# Patient Record
Sex: MALE | Race: WHITE | NOT HISPANIC OR LATINO | Employment: FULL TIME | URBAN - METROPOLITAN AREA
[De-identification: names, ages, dates, MRNs, and addresses within clinical notes are randomized per-mention and may not be internally consistent; named-entity substitution may affect disease eponyms.]

---

## 2017-01-10 DIAGNOSIS — I10 ESSENTIAL HYPERTENSION WITH GOAL BLOOD PRESSURE LESS THAN 140/90: Primary | ICD-10-CM

## 2017-01-10 NOTE — TELEPHONE ENCOUNTER
Patient informed in June to return in 6 months for appointment and labs.  Patient didn't do this.    No appointment pending at this time.  Routing to provider to advise.    Hermelinda Cornelius RN

## 2017-01-10 NOTE — Clinical Note
[facility]                                             [address] [place]  [address]    January 12, 2017    Jose Hernandez  [address] [facility] [address]    Dear Jose,       We recently received a refill request for lisinopril-hydrochlorothiazide (PRINZIDE/ZESTORETIC) 10-12.5 MG per tablet.  We have refilled this for a one time 30 day supply only because you are due for a:    Blood pressure office visit with Dr Mcpherson      Please call at your earliest convenience so that there will not be a delay with your future refills.          Thank you,   Your [facility] Care Team/  [phone]

## 2017-01-11 RX ORDER — LISINOPRIL/HYDROCHLOROTHIAZIDE 10-12.5 MG
1 TABLET ORAL DAILY
Qty: 30 TABLET | Refills: 0 | Status: SHIPPED | OUTPATIENT
Start: 2017-01-11 | End: 2017-03-24

## 2017-02-08 DIAGNOSIS — I10 ESSENTIAL HYPERTENSION WITH GOAL BLOOD PRESSURE LESS THAN 140/90: Primary | ICD-10-CM

## 2017-02-08 RX ORDER — LISINOPRIL/HYDROCHLOROTHIAZIDE 10-12.5 MG
1 TABLET ORAL DAILY
Qty: 30 TABLET | Refills: 0 | Status: CANCELLED | OUTPATIENT
Start: 2017-02-08

## 2017-02-08 NOTE — TELEPHONE ENCOUNTER
See last refill.  Pharmacy notified of denial of refill of lisinopril-hctz, ov needed.   pharmacy notified.  To provider to cosign  Mayra Freitas RN

## 2017-02-21 DIAGNOSIS — I10 ESSENTIAL HYPERTENSION WITH GOAL BLOOD PRESSURE LESS THAN 140/90: ICD-10-CM

## 2017-02-21 RX ORDER — LISINOPRIL/HYDROCHLOROTHIAZIDE 10-12.5 MG
1 TABLET ORAL DAILY
Qty: 30 TABLET | Refills: 0 | Status: CANCELLED | OUTPATIENT
Start: 2017-02-21

## 2017-02-22 NOTE — TELEPHONE ENCOUNTER
BARTOLO at [facility] calling on status of lisinopril refill.  His phone is [phone].

## 2017-02-23 NOTE — TELEPHONE ENCOUNTER
See  Previous refill requests for lisinopril-hctz.   Pharmacy notified again refill is denied, pt due for appointment.   Mayra Freitas RN

## 2017-03-24 ENCOUNTER — OFFICE VISIT (OUTPATIENT)
Dept: FAMILY MEDICINE | Facility: CLINIC | Age: 42
End: 2017-03-24
Payer: COMMERCIAL

## 2017-03-24 DIAGNOSIS — E78.5 DYSLIPIDEMIA: Primary | ICD-10-CM

## 2017-03-24 DIAGNOSIS — I10 ESSENTIAL HYPERTENSION WITH GOAL BLOOD PRESSURE LESS THAN 140/90: ICD-10-CM

## 2017-03-24 PROCEDURE — 99214 OFFICE O/P EST MOD 30 MIN: CPT | Performed by: FAMILY MEDICINE

## 2017-03-24 RX ORDER — LISINOPRIL/HYDROCHLOROTHIAZIDE 10-12.5 MG
1 TABLET ORAL DAILY
Qty: 90 TABLET | Refills: 3 | Status: SHIPPED | OUTPATIENT
Start: 2017-03-24 | End: 2018-03-22

## 2017-03-24 NOTE — NURSING NOTE
"Chief Complaint   Patient presents with     Hypertension       Initial BP (!) 136/92 (Cuff Size: Adult Large)  Pulse 62  Temp 97  F (36.1  C) (Oral)  Ht 5' 10\" (1.778 m)  Wt 195 lb (88.5 kg)  SpO2 98%  BMI 27.98 kg/m2 Estimated body mass index is 27.98 kg/(m^2) as calculated from the following:    Height as of this encounter: 5' 10\" (1.778 m).    Weight as of this encounter: 195 lb (88.5 kg).  Medication Reconciliation: complete    Rebecca Salcido LPN    "

## 2017-03-24 NOTE — PROGRESS NOTES
HPI:    Jose is a 41 year old male here for follow up:    HTN - dx'd Jan 2015. He checks at home about 2 times per week - around 130/80. Controlled in clinic.  Treatment:   Prinzide 10/12.5 qd. No side effects.    Last Basic Metabolic Panel:  NA      144   12/15/2015   POTASSIUM      3.9   12/15/2015  CHLORIDE      109   12/15/2015  DAVID      8.8   12/15/2015  CO2       27   12/15/2015  BUN       13   12/15/2015  CR     1.26   12/15/2015  GLC       93   12/15/2015  CBC RESULTS:   Recent Labs   Lab Test  02/04/15   0742   WBC  6.2   RBC  5.10   HGB  15.1   HCT  44.7   MCV  88   MCH  29.6   MCHC  33.8   RDW  12.4   PLT  273     Dyslipidemia (trigs as high as 799 on 12/16/11) - No history of CAD, CVA, PAD or diabetes. Per ATP4, moderate intensity statin recommended.  Treatment:   Taking Fenofibrate 160 mg daily without side effects. Also fish oil 3 gm per day.     The 10-year ASCVD risk score (Bridget DC Jr, et al., 2013) is: 1%    Values used to calculate the score:      Age: 41 years      Sex: Male      Is Non- : No      Diabetic: No      Tobacco smoker: No      Systolic Blood Pressure: 136 mmHg      Is BP treated: Yes      HDL Cholesterol: 44 mg/dL      Total Cholesterol: 130 mg/dL    Recent Labs   Lab Test  12/15/15   0730  02/04/15   0742   09/05/13   0724   CHOL  130  181   --   207*   HDL  44  37*   --   33*   LDL  63  90   --   Cannot estimate LDL when triglyceride exceeds 400 mg/dL  80   TRIG  114  269*   < >  461*   CHOLHDLRATIO   --   4.9   --   6.4*    < > = values in this interval not displayed.       Obesity - declined referral to nutrition. Discussed diet and exercise. He has lost quite a bit of weight on his own. I commended him.    TSH     Date   Value   Ref Range   Status     2/4/2015   1.15    0.40 - 4.00 mU/L   Final     Effective 7/30/2014, the reference range for this assay has changed to reflect  new instrumentation/methodology.      Wt Readings from Last 5 Encounters:  "  03/24/17 195 lb (88.5 kg)   06/21/16 189 lb (85.7 kg)   12/22/15 199 lb (90.3 kg)   05/04/15 228 lb (103.4 kg)   02/25/15 231 lb (104.8 kg)     Preventive -     Flu shot: declined     Immunization History   Administered Date(s) Administered     Influenza (IIV3) 01/12/2015     TDAP Vaccine (Adacel) 12/16/2011       ROS:    Const: No fevers or night sweats recently.  ENT: No runny nose, sore throat or ear pain.  Resp: No cough or shortness of breath.  CV: No chest pain, dizziness or cardiac palpitations.  GI: No nausea, vomiting, diarrhea or constipation. Denies blood in stools or black stools.  : No dysuria, frequency or hematuria.    SH:    Marital status: marrried  Kids: 4  Employment: [employer]  Exercise: Tae Marnie Do 2-3 per week, some running  Tobacco: no  Etoh: 1 per month  Recreational drugs: no  Caffeine: one per day but now nothing    FH:    Unknown if there is HTN.      Exam:    /88 (Cuff Size: Adult Large)  Pulse 62  Temp 97  F (36.1  C) (Oral)  Ht 5' 10\" (1.778 m)  Wt 195 lb (88.5 kg)  SpO2 98%  BMI 27.98 kg/m2    Gen: Healthy appearing male in no acute distress  Neck: No enlarged lymph nodes, thyromegally or other masses.  Lungs: Good air movement and otherwise clear.  CV: Heart RRR with no murmurs. No JVD, carotid bruits or leg edema.      Assessment and Plan - Decision Making    1. Essential hypertension with goal blood pressure less than 140/90  Controlled. Continue same tx. Check future labs since not fasting today.  - lisinopril-hydrochlorothiazide (PRINZIDE/ZESTORETIC) 10-12.5 MG per tablet; Take 1 tablet by mouth daily  Dispense: 90 tablet; Refill: 3  - Basic metabolic panel; Future    2. Dyslipidemia  Controlled. Continue same tx. Check future labs since not fasting today.  - Lipid panel reflex to direct LDL; Future      Written instructions given as follows:    Patient Instructions   1. Please make a lab only appointment to give fasting blood sample.    2. Keep an eye " on your blood pressure at home and let me know if 140/90 or greater persistently.    3. See you back in 1 year for a physical with fasting labs.

## 2017-03-24 NOTE — MR AVS SNAPSHOT
After Visit Summary   3/24/2017    Jose Hernandez    MRN: [id number]           Patient Information     Date Of Birth          1975        Visit Information        Provider Department      3/24/2017 8:50 AM Silverio Mcpherson MD [facility]        Today's Diagnoses     Essential hypertension with goal blood pressure less than 140/90          Care Instructions    1. Please make a lab only appointment to give fasting blood sample.    2. Keep an eye on your blood pressure at home and let me know if 140/90 or greater persistently.    3. See you back in 1 year for a physical with fasting labs.        Follow-ups after your visit        Future tests that were ordered for you today     Open Future Orders        Priority Expected Expires Ordered    Basic metabolic panel Routine  3/24/2018 3/24/2017            Who to contact     If you have questions or need follow up information about today's clinic visit or your schedule please contact [facility] directly at [phone].  Normal or non-critical lab and imaging results will be communicated to you by [employer], letter or phone within 4 business days after the clinic has received the results. If you do not hear from us within 7 days, please contact the clinic through [employer] or phone. If you have a critical or abnormal lab result, we will notify you by phone as soon as possible.  Submit refill requests through [employer] or call your pharmacy and they will forward the refill request to us. Please allow 3 business days for your refill to be completed.          Additional Information About Your Visit        [employer] Information     [employer] gives you secure access to your electronic health record. If you see a primary care provider, you can also send messages to your care team and make appointments. If you have questions, please call your primary care clinic.  If you do not have a primary care provider, please call [phone] and they will  "assist you.        Care EveryWhere ID     This is your Care EveryWhere ID. This could be used by other organizations to access your [place] medical records  [id number]        Your Vitals Were     Pulse Temperature Height Pulse Oximetry BMI (Body Mass Index)       62 97  F (36.1  C) (Oral) 5' 10\" (1.778 m) 98% 27.98 kg/m2        Blood Pressure from Last 3 Encounters:   03/24/17 (!) 136/92   06/21/16 136/72   12/22/15 146/84    Weight from Last 3 Encounters:   03/24/17 195 lb (88.5 kg)   06/21/16 189 lb (85.7 kg)   12/22/15 199 lb (90.3 kg)                 Today's Medication Changes          These changes are accurate as of: 3/24/17  9:16 AM.  If you have any questions, ask your nurse or doctor.               These medicines have changed or have updated prescriptions.        Dose/Directions    lisinopril-hydrochlorothiazide 10-12.5 MG per tablet   Commonly known as:  PRINZIDE/ZESTORETIC   This may have changed:  additional instructions   Used for:  Essential hypertension with goal blood pressure less than 140/90   Changed by:  Silverio Mcpherson MD        Dose:  1 tablet   Take 1 tablet by mouth daily   Quantity:  90 tablet   Refills:  3            Where to get your medicines      These medications were sent to [place] [address] [place] [address] [address]     Phone:  [phone]     lisinopril-hydrochlorothiazide 10-12.5 MG per tablet                Primary Care Provider Office Phone # Fax #    Silverio Mcpherson [phone]       [facility] [address] [facility] [address]        Thank you!     Thank you for choosing [facility]  for your care. Our goal is always to provide you with excellent care. Hearing back from our patients is one way we can continue to improve our services. Please take a few minutes to complete the written survey that you may receive in the mail after your visit with us. Thank you!           "   Your Updated Medication List - Protect others around you: Learn how to safely use, store and throw away your medicines at www.disposemymeds.org.          This list is accurate as of: 3/24/17  9:16 AM.  Always use your most recent med list.                   Brand Name Dispense Instructions for use    FISH OIL      3 g daily.       lisinopril-hydrochlorothiazide 10-12.5 MG per tablet    PRINZIDE/ZESTORETIC    90 tablet    Take 1 tablet by mouth daily       MULTI VITAMIN MENS PO      Take  by mouth.       Vitamin C 500 MG Caps

## 2017-03-24 NOTE — PATIENT INSTRUCTIONS
1. Please make a lab only appointment to give fasting blood sample.    2. Keep an eye on your blood pressure at home and let me know if 140/90 or greater persistently.    3. See you back in 1 year for a physical with fasting labs.

## 2017-03-26 VITALS
WEIGHT: 195 LBS | SYSTOLIC BLOOD PRESSURE: 136 MMHG | DIASTOLIC BLOOD PRESSURE: 88 MMHG | BODY MASS INDEX: 27.92 KG/M2 | OXYGEN SATURATION: 98 % | HEART RATE: 62 BPM | TEMPERATURE: 97 F | HEIGHT: 70 IN

## 2017-03-26 PROBLEM — E78.5 DYSLIPIDEMIA: Status: ACTIVE | Noted: 2017-03-26

## 2017-11-03 ENCOUNTER — NURSE TRIAGE (OUTPATIENT)
Dept: NURSING | Facility: CLINIC | Age: 42
End: 2017-11-03

## 2017-11-03 ENCOUNTER — OFFICE VISIT (OUTPATIENT)
Dept: URGENT CARE | Facility: URGENT CARE | Age: 42
End: 2017-11-03
Payer: COMMERCIAL

## 2017-11-03 VITALS
DIASTOLIC BLOOD PRESSURE: 85 MMHG | HEART RATE: 66 BPM | TEMPERATURE: 96.7 F | BODY MASS INDEX: 28.27 KG/M2 | OXYGEN SATURATION: 98 % | WEIGHT: 197 LBS | SYSTOLIC BLOOD PRESSURE: 139 MMHG

## 2017-11-03 DIAGNOSIS — E78.5 HYPERLIPIDEMIA LDL GOAL <130: ICD-10-CM

## 2017-11-03 DIAGNOSIS — I73.00 RAYNAUD'S PHENOMENON WITHOUT GANGRENE: Primary | ICD-10-CM

## 2017-11-03 DIAGNOSIS — I10 ESSENTIAL HYPERTENSION WITH GOAL BLOOD PRESSURE LESS THAN 140/90: ICD-10-CM

## 2017-11-03 PROCEDURE — 99213 OFFICE O/P EST LOW 20 MIN: CPT | Performed by: FAMILY MEDICINE

## 2017-11-03 NOTE — NURSING NOTE
"Chief Complaint   Patient presents with     Musculoskeletal Problem     Finger tips on the right hand is colder than on the Left, Had it for 2 days        Initial /85  Pulse 66  Temp 96.7  F (35.9  C) (Oral)  Wt 197 lb (89.4 kg)  SpO2 98%  BMI 28.27 kg/m2 Estimated body mass index is 28.27 kg/(m^2) as calculated from the following:    Height as of 3/24/17: 5' 10\" (1.778 m).    Weight as of this encounter: 197 lb (89.4 kg).  Medication Reconciliation: complete     HUGO RODRIGUEZ      "

## 2017-11-03 NOTE — PROGRESS NOTES
SUBJECTIVE:                                                    Jose Hernandez is a 42 year old male who presents to clinic today for the following health issues:      Musculoskeletal problem/pain      Duration: 2 days    Description  Location: Right hand, finger tips    Intensity:  moderate    Accompanying signs and symptoms: none    History  Previous similar problem: no   Previous evaluation:  none    Precipitating or alleviating factors:  Trauma or overuse: no   Aggravating factors include: none    Therapies tried and outcome: nothing    3-4 days ago started noticing that his right hand was colder than the left.  Didn't think too much of it.  However then he started going on google is now worried about circulation issue.  Patient called nurse line and was advised to go to ER.    When he gets up and starts moving it warms up    Non smoker.     PERC Rule     Age <50 years   Heart rate <100 beats/minute   Oxyhemoglobin saturation ?95 percent   No hemoptysis   No estrogen use   No prior DVT or PE   No unilateral leg or extremity  swelling   No surgery/trauma requiring hospitalization within the prior four weeks    Problem list and histories reviewed & adjusted, as indicated.  Additional history: as documented    Problem list, Medication list, Allergies, and Medical/Social/Surgical histories reviewed in EPIC and updated as appropriate.    ROS:  Constitutional, HEENT, cardiovascular, pulmonary, gi and gu systems are negative, except as otherwise noted.    OBJECTIVE:                                                    /85  Pulse 66  Temp 96.7  F (35.9  C) (Oral)  Wt 197 lb (89.4 kg)  SpO2 98%  BMI 28.27 kg/m2  Body mass index is 28.27 kg/(m^2).   BLOOD PRESSURE MEASURED IN BOTH ARMS ARE THE SAME: 120/70  Awake alert not in any acute cardiorespiratory distress  Eyes: anicteric  Heart: Regular Rate and Rhythm, no murmurs, rubs or gallops  Lungs: Symmetrical Chest expansion, no retractions, clear breath  sounds  Psych: pleasant   Neurologic: No gross neurologic deficits  Skin: no obvious lesions or rashes  Musculoskeletal:  Affected extremity neurovascularly intact, no cyanosis or edema, good pulses and capillary refill.   Jose's test: circulation is good.  Both arms and hands are well perfused, warm, and not cyanotic at all.       Diagnostic Test Results:  No results found for this or any previous visit (from the past 24 hour(s)).     ASSESSMENT/PLAN:                                                        ICD-10-CM    1. Raynaud's phenomenon without gangrene I73.00 RHEUMATOLOGY REFERRAL       Differentials:   Raynaud's vs recent cold injury - it has been cold lately patient walking outside without wearing gloves.  Warms really well. No residual coldness or pallor.  If symptoms persist recommend follow up with rheumatology or with primary care provider and patient voiced understanding  Discussed obtaining doppler ultrasound and arterial studies to be sure. Clinical suspicion for DVT is low but offered to rule it out. Patient declined  Recommend follow up with primary care provider to make sure his BP and his lipids are well controlled to decrease risk for atherosclerosis.   He will go to the ER if worsening symptoms or if no improvement with warming  Alarm signs or symptoms discussed, if present recommend go to ER    Aware to come back in if with worsening symptoms or if no relief despite treatment plan  Patient voiced understanding and had no further questions.     MD Rabia Cruz MD  [facility]

## 2017-11-03 NOTE — TELEPHONE ENCOUNTER
Notice this week , my right hand is  markedly colder than Left .  Color is same but temp is off , improved with shaking , movement and strength is equal .  FNA recommend going into ED for more complete testing , but Pt thinks he will go to [facility] at 5pm when they open  Instead. .Tracey Mitchell RN [place] nurse advisors.    Reason for Disposition    Nursing judgment or information in reference    Additional Information    Negative: Nursing judgment, per information in Reference    Negative: Information only call about a Well Adult (no illness or injury)    Protocols used: NO GUIDELINE AVAILABLE - SICK ADULT CALL-ADULT-

## 2017-11-03 NOTE — MR AVS SNAPSHOT
After Visit Summary   11/3/2017    Jose Hernandez    MRN: [id number]           Patient Information     Date Of Birth          1975        Visit Information        Provider Department      11/3/2017 5:05 PM Rabia Ventura MD [facility]        Today's Diagnoses     Raynaud's phenomenon without gangrene    -  1      Care Instructions      Raynaud Disease  Your healthcare provider has told you that you have Raynaud disease. It is also called Raynaud phenomenon or Raynaud syndrome. There is no cure for Raynaud disease, but you can manage it to help prevent attacks.    What are the symptoms of Raynaud disease?  A Raynaud disease attack is often triggered by cold or stress. During an attack, blood vessels suddenly narrow (called vasospasm).  This most often happens in fingers and toes. In rare cases, the nose, ears, or even tongue are affected. Narrowed blood vessels reduce the blood supply to the area. The area then turns white, then blue. The area may feel numb or painful. As the attack passes, the blood vessels open. The affected area may turn bright red as it warms up, then returns to normal color.  What is the cause of Raynaud disease?  With Raynaud disease, it is believed that blood vessels in the affected areas overrespond to certain triggers, such as cold. This makes them narrow (called vasospasm) much more than in people without the disease. What causes the blood vessels to react so strongly to certain triggers is unknown. In between attacks, the blood vessels are normal and healthy. Attacks don t permanently damage the blood vessels, but may thicken the artery walls.   In some cases, Raynaud disease happens along with another disease or condition. This is often a connective tissue disorder, such as lupus, scleroderma, or rheumatoid arthritis. This is called secondary Raynaud disease (as opposed to primary Raynaud disease discussed above) and may be more severe. If this is  the case for you, you and your healthcare provider can discuss treatment for the underlying condition.  What are the risk factors?  Risk factors for Raynaud disease include:    Women are more likely to get Raynaud disease than men.    Younger individuals are at higher risk, usually ages 15 to 30.    Living in colder climates increases risk.    Having a family member with Raynaud disease increases one's risk.    Underlying rheumatoid conditions may increase one's risk.   What are possible triggers?  Triggers for Raynaud disease include:     Cold    Stress    Caffeine    Smoking    Repetitive movements    Certain medicines, such as beta-blockers, migraine medicine, birth control pills and others    Injury  How is Raynaud disease diagnosed?  Your description of your symptoms, a health history, and a physical exam are often enough for a diagnosis. Blood tests and other tests may be done to see if any underlying conditions are present and rule out other problems.  How is Raynaud disease treated?  There is no cure for Raynaud disease. But you can control symptoms and reduce the number and severity of attacks. For most people, avoiding triggers is enough to limit attacks. Your healthcare provider may suggest the following:    Take precautions to help prevent your hands and feet from losing circulation. This includes:    Dressing warmly in cold weather.    Wear gloves or mittens when your hands may become cold, such as when you use the refrigerator or freezer.    Avoid stress and caffeine.    Exercise regularly. This may reduce the number and severity of attacks.     If you smoke, quitting may improve the condition. This is because smoking causes your blood vessels to narrow and reduces blood flow.    Soak your hands or feet in warm (not hot) water. Do this at the first sign of attack. Keep soaking until your skin color returns to normal.  In some people, symptoms are persistent or troubling. For these cases, other  treatments are a choice. Your healthcare provider can tell you more about the following:    Prescription medicines that relax and widen blood vessels, such as calcium channel blockers. These may help relieve symptoms.    Nerve surgery for severe cases that don t respond to other treatments. Surgery removes the nerves that surround the blood vessels in the hands and feet. Without nerve stimulation, the blood vessels stay more relaxed. They are less likely to become very narrow due to stimulus. Nerves may be blocked using injections in some cases.  Most cases of Raynaud disease are not cause for concern. The disease doesn t get worse and isn t likely to cause any permanent damage. If attacks are severe, very prolonged, or very often, skin damage may result. Controlling attacks can help prevent this.  When to seek medical care  The following problems happen rarely, but they can be serious. Call your healthcare provider right away if you notice any of the following:    Infection or sores on the skin    A finger or toe turns black    The skin breaks open on its own    A rash develops    A finger or toe joint becomes painful or swollen   Date Last Reviewed: 1/27/2016 2000-2017 [employer]. [address] [place] [address]. All rights reserved. This information is not intended as a substitute for professional medical care. Always follow your healthcare professional's instructions.                Follow-ups after your visit        Additional Services     RHEUMATOLOGY REFERRAL       Your provider has referred you to: [facility]: [facility] [phone]   [email or web address]  [facility]: [place] [facility] [place] [phone]   [email or web address]  [facility]: [facility] [phone]   [email or web address]    Please be aware that coverage of these services is  subject to the terms and limitations of your health insurance plan.  Call member services at your health plan with any benefit or coverage questions.      Please bring the following with you to your appointment:    (1) Any X-Rays, CTs or MRIs which have been performed.  Contact the facility where they were done to arrange for  prior to your scheduled appointment.    (2) List of current medications   (3) This referral request   (4) Any documents/labs given to you for this referral                  Who to contact     If you have questions or need follow up information about today's clinic visit or your schedule please contact [facility] directly at [phone].  Normal or non-critical lab and imaging results will be communicated to you by MyChart, letter or phone within 4 business days after the clinic has received the results. If you do not hear from us within 7 days, please contact the clinic through [facility] or phone. If you have a critical or abnormal lab result, we will notify you by phone as soon as possible.  Submit refill requests through [address] or call your pharmacy and they will forward the refill request to us. Please allow 3 business days for your refill to be completed.          Additional Information About Your Visit        [facility] Information     [address] [address] gives you secure access to your electronic health record. If you see a primary care provider, you can also send messages to your care team and make appointments. If you have questions, please call your primary care clinic.  If you do not have a primary care provider, please call [phone] and they will assist you.        Care EveryWhere ID     This is your Care EveryWhere ID. This could be used by other organizations to access your [facility] medical records  [id number]        Your Vitals Were     Pulse Temperature Pulse Oximetry BMI (Body Mass Index)          66 96.7  F (35.9  C) (Oral) 98% 28.27 kg/m2         Blood Pressure  from Last 3 Encounters:   11/03/17 139/85   03/24/17 136/88   06/21/16 136/72    Weight from Last 3 Encounters:   11/03/17 197 lb (89.4 kg)   03/24/17 195 lb (88.5 kg)   06/21/16 189 lb (85.7 kg)              We Performed the Following     RHEUMATOLOGY REFERRAL        Primary Care Provider Office Phone # Fax #    Silverio Mcpherson [phone] [address] [facility] [id number]        Equal Access to Services     [facility]: Hadii aad ku hadasho Soomaali, waaxda luqadaha, qaybta kaalmada adeegyada, waxdipesh bronson haymario warner . So [facility] [phone].    ATENCIÓN: Si habla español, tiene a farrell disposición servicios gratuitos de asistencia lingüística. [facility] [phone].    We comply with applicable federal civil rights laws and [place] laws. We do not discriminate on the basis of race, color, national origin, age, disability, sex, sexual orientation, or gender identity.            Thank you!     Thank you for choosing [facility] [place]  for your care. Our goal is always to provide you with excellent care. Hearing back from our patients is one way we can continue to improve our services. Please take a few minutes to complete the written survey that you may receive in the mail after your visit with us. Thank you!             Your Updated Medication List - Protect others around you: Learn how to safely use, store and throw away your medicines at www.disposemymeds.org.          This list is accurate as of: 11/3/17  5:37 PM.  Always use your most recent med list.                   Brand Name Dispense Instructions for use Diagnosis    FISH OIL      3 g daily.        lisinopril-hydrochlorothiazide 10-12.5 MG per tablet    PRINZIDE/ZESTORETIC    90 tablet    Take 1 tablet by mouth daily    Essential hypertension with goal blood pressure less than 140/90       MULTI VITAMIN MENS PO      Take  by mouth.        Vitamin C 500 MG Caps

## 2017-11-03 NOTE — PATIENT INSTRUCTIONS
Raynaud Disease  Your healthcare provider has told you that you have Raynaud disease. It is also called Raynaud phenomenon or Raynaud syndrome. There is no cure for Raynaud disease, but you can manage it to help prevent attacks.    What are the symptoms of Raynaud disease?  A Raynaud disease attack is often triggered by cold or stress. During an attack, blood vessels suddenly narrow (called vasospasm).  This most often happens in fingers and toes. In rare cases, the nose, ears, or even tongue are affected. Narrowed blood vessels reduce the blood supply to the area. The area then turns white, then blue. The area may feel numb or painful. As the attack passes, the blood vessels open. The affected area may turn bright red as it warms up, then returns to normal color.  What is the cause of Raynaud disease?  With Raynaud disease, it is believed that blood vessels in the affected areas overrespond to certain triggers, such as cold. This makes them narrow (called vasospasm) much more than in people without the disease. What causes the blood vessels to react so strongly to certain triggers is unknown. In between attacks, the blood vessels are normal and healthy. Attacks don t permanently damage the blood vessels, but may thicken the artery walls.   In some cases, Raynaud disease happens along with another disease or condition. This is often a connective tissue disorder, such as lupus, scleroderma, or rheumatoid arthritis. This is called secondary Raynaud disease (as opposed to primary Raynaud disease discussed above) and may be more severe. If this is the case for you, you and your healthcare provider can discuss treatment for the underlying condition.  What are the risk factors?  Risk factors for Raynaud disease include:    Women are more likely to get Raynaud disease than men.    Younger individuals are at higher risk, usually ages 15 to 30.    Living in colder climates increases risk.    Having a family member with  Raynaud disease increases one's risk.    Underlying rheumatoid conditions may increase one's risk.   What are possible triggers?  Triggers for Raynaud disease include:     Cold    Stress    Caffeine    Smoking    Repetitive movements    Certain medicines, such as beta-blockers, migraine medicine, birth control pills and others    Injury  How is Raynaud disease diagnosed?  Your description of your symptoms, a health history, and a physical exam are often enough for a diagnosis. Blood tests and other tests may be done to see if any underlying conditions are present and rule out other problems.  How is Raynaud disease treated?  There is no cure for Raynaud disease. But you can control symptoms and reduce the number and severity of attacks. For most people, avoiding triggers is enough to limit attacks. Your healthcare provider may suggest the following:    Take precautions to help prevent your hands and feet from losing circulation. This includes:    Dressing warmly in cold weather.    Wear gloves or mittens when your hands may become cold, such as when you use the refrigerator or freezer.    Avoid stress and caffeine.    Exercise regularly. This may reduce the number and severity of attacks.     If you smoke, quitting may improve the condition. This is because smoking causes your blood vessels to narrow and reduces blood flow.    Soak your hands or feet in warm (not hot) water. Do this at the first sign of attack. Keep soaking until your skin color returns to normal.  In some people, symptoms are persistent or troubling. For these cases, other treatments are a choice. Your healthcare provider can tell you more about the following:    Prescription medicines that relax and widen blood vessels, such as calcium channel blockers. These may help relieve symptoms.    Nerve surgery for severe cases that don t respond to other treatments. Surgery removes the nerves that surround the blood vessels in the hands and feet. Without  nerve stimulation, the blood vessels stay more relaxed. They are less likely to become very narrow due to stimulus. Nerves may be blocked using injections in some cases.  Most cases of Raynaud disease are not cause for concern. The disease doesn t get worse and isn t likely to cause any permanent damage. If attacks are severe, very prolonged, or very often, skin damage may result. Controlling attacks can help prevent this.  When to seek medical care  The following problems happen rarely, but they can be serious. Call your healthcare provider right away if you notice any of the following:    Infection or sores on the skin    A finger or toe turns black    The skin breaks open on its own    A rash develops    A finger or toe joint becomes painful or swollen   Date Last Reviewed: 1/27/2016 2000-2017 [employer]. [address] [place] [address], [place] [address]. All rights reserved. This information is not intended as a substitute for professional medical care. Always follow your healthcare professional's instructions.

## 2018-03-22 DIAGNOSIS — I10 ESSENTIAL HYPERTENSION WITH GOAL BLOOD PRESSURE LESS THAN 140/90: ICD-10-CM

## 2018-03-22 RX ORDER — LISINOPRIL/HYDROCHLOROTHIAZIDE 10-12.5 MG
TABLET ORAL
Qty: 30 TABLET | Refills: 0 | Status: SHIPPED | OUTPATIENT
Start: 2018-03-22 | End: 2018-04-22

## 2018-03-22 NOTE — TELEPHONE ENCOUNTER
#30 only as patient is overdue for appointment     TC, patient due for:  OV with PCP for BP,    Health Maintenance Due   Topic Date Due     LIPID MONITORING Q1 YEAR  12/15/2016         Hermelinda Cornelius RN

## 2018-03-22 NOTE — LETTER
March 22, 2018    Jose Hernandez  [address] [facility] [address]    Dear Jose,       We recently received a refill request for lisinopril-hydrochlorothiazide.  We have refilled this for a one time 30 day supply only because you are due for a:    Blood pressure office visit and fasting lab appointment      Please schedule this lab appointment 4-5 days prior to the office visit.     Please call at your earliest convenience so that there will not be a delay with your future refills.          Thank you,   Your [facility] Team/  [phone]

## 2018-04-22 DIAGNOSIS — I10 ESSENTIAL HYPERTENSION WITH GOAL BLOOD PRESSURE LESS THAN 140/90: ICD-10-CM

## 2018-04-23 NOTE — TELEPHONE ENCOUNTER
Routing refill request to provider for review/approval because:  Neisha given x1 and patient did not follow up, please advise  Mayra Freitas RN

## 2018-04-24 RX ORDER — LISINOPRIL/HYDROCHLOROTHIAZIDE 10-12.5 MG
TABLET ORAL
Qty: 15 TABLET | Refills: 0 | Status: SHIPPED | OUTPATIENT
Start: 2018-04-24 | End: 2018-05-07

## 2018-05-04 ENCOUNTER — DOCUMENTATION ONLY (OUTPATIENT)
Dept: LAB | Facility: CLINIC | Age: 43
End: 2018-05-04

## 2018-05-04 NOTE — PROGRESS NOTES
This patient has overdue labs. A letter was sent on 3/29/2018 and there has been no lab appointment made. If you still want these labs done, please have your care team contact the patient to make a lab appointment. Otherwise, please have the labs discontinued and close the encounter.    Thank you,  [facility]

## 2018-05-07 DIAGNOSIS — I10 ESSENTIAL HYPERTENSION WITH GOAL BLOOD PRESSURE LESS THAN 140/90: ICD-10-CM

## 2018-05-09 RX ORDER — LISINOPRIL/HYDROCHLOROTHIAZIDE 10-12.5 MG
TABLET ORAL
Qty: 10 TABLET | Refills: 0 | Status: SHIPPED | OUTPATIENT
Start: 2018-05-09 | End: 2022-06-25

## 2018-05-09 NOTE — TELEPHONE ENCOUNTER
Routing refill request to provider for review/approval because:  Neisha given x2 and patient did not follow up, please advise  Mayra Freitas RN

## 2018-06-08 ENCOUNTER — TELEPHONE (OUTPATIENT)
Dept: FAMILY MEDICINE | Facility: CLINIC | Age: 43
End: 2018-06-08

## 2018-06-08 NOTE — TELEPHONE ENCOUNTER
Patient is calling because he would like to know if he needs lab work before he can get blood pressure medication renewed. Please follow up with patient.

## 2018-06-08 NOTE — TELEPHONE ENCOUNTER
Pt advised he is due for a fasting lab appointment and ov with Dr. Silverio Mcphreson for further refills.  Mayra STEWARTN, RN

## 2018-06-11 ENCOUNTER — DOCUMENTATION ONLY (OUTPATIENT)
Dept: LAB | Facility: CLINIC | Age: 43
End: 2018-06-11

## 2018-06-11 DIAGNOSIS — E78.5 DYSLIPIDEMIA: ICD-10-CM

## 2018-06-11 DIAGNOSIS — I10 ESSENTIAL HYPERTENSION WITH GOAL BLOOD PRESSURE LESS THAN 140/90: Primary | ICD-10-CM

## 2018-06-11 NOTE — PROGRESS NOTES
This patient has a lab only appointment on 6/15/2018 but does not have future orders. Please review, associate diagnosis and sign pending lab orders for the upcoming appointment.  He has an appointment with Dr. Mcpherson on 6/19/2018.    Thank you,    [facility]

## 2018-06-15 DIAGNOSIS — I10 ESSENTIAL HYPERTENSION WITH GOAL BLOOD PRESSURE LESS THAN 140/90: ICD-10-CM

## 2018-06-15 DIAGNOSIS — E78.5 DYSLIPIDEMIA: ICD-10-CM

## 2018-06-15 LAB
ANION GAP SERPL CALCULATED.3IONS-SCNC: 8 MMOL/L (ref 3–14)
BUN SERPL-MCNC: 15 MG/DL (ref 7–30)
CALCIUM SERPL-MCNC: 9 MG/DL (ref 8.5–10.1)
CHLORIDE SERPL-SCNC: 105 MMOL/L (ref 94–109)
CHOLEST SERPL-MCNC: 208 MG/DL
CO2 SERPL-SCNC: 27 MMOL/L (ref 20–32)
CREAT SERPL-MCNC: 1.04 MG/DL (ref 0.66–1.25)
GFR SERPL CREATININE-BSD FRML MDRD: 78 ML/MIN/1.7M2
GLUCOSE SERPL-MCNC: 98 MG/DL (ref 70–99)
HDLC SERPL-MCNC: 41 MG/DL
LDLC SERPL CALC-MCNC: 111 MG/DL
NONHDLC SERPL-MCNC: 167 MG/DL
POTASSIUM SERPL-SCNC: 3.8 MMOL/L (ref 3.4–5.3)
SODIUM SERPL-SCNC: 140 MMOL/L (ref 133–144)
TRIGL SERPL-MCNC: 279 MG/DL

## 2018-06-15 PROCEDURE — 80061 LIPID PANEL: CPT | Performed by: FAMILY MEDICINE

## 2018-06-15 PROCEDURE — 36415 COLL VENOUS BLD VENIPUNCTURE: CPT | Performed by: FAMILY MEDICINE

## 2018-06-15 PROCEDURE — 80048 BASIC METABOLIC PNL TOTAL CA: CPT | Performed by: FAMILY MEDICINE

## 2018-06-18 NOTE — PROGRESS NOTES
HPI:    Jose is a 42 year old male here for follow up:    HTN - dx'd Jan 2015. He checks at home about 1 per wee - around 130/80. Controlled in clinic.  Evaluation and treatment:    Prinzide 10/12.5 qd. No side effects.   Continue same tx.    BP Readings from Last 6 Encounters:   06/19/18 131/84   11/03/17 139/85   03/24/17 136/88   06/21/16 136/72   12/22/15 146/84   05/04/15 139/88       Last Basic Metabolic Panel:  Lab Results   Component Value Date     06/15/2018      Lab Results   Component Value Date    POTASSIUM 3.8 06/15/2018     Lab Results   Component Value Date    CHLORIDE 105 06/15/2018     Lab Results   Component Value Date    DAVID 9.0 06/15/2018     Lab Results   Component Value Date    CO2 27 06/15/2018     Lab Results   Component Value Date    BUN 15 06/15/2018     Lab Results   Component Value Date    CR 1.04 06/15/2018     Lab Results   Component Value Date    GLC 98 06/15/2018       Dyslipidemia (trigs as high as 799 on 12/16/11) - No history of CAD, CVA, PAD or diabetes.   Evaluation and treatment:   Per ATP4, no statin recommended.    Fenofibrate and Fish Oil - I told him he does not need to take these.      The 10-year ASCVD risk score ([place] ODELL Jr, et al., 2013) is: 2.7%    Values used to calculate the score:      Age: 42 years      Sex: Male      Is Non- : No      Diabetic: No      Tobacco smoker: No      Systolic Blood Pressure: 131 mmHg      Is BP treated: Yes      HDL Cholesterol: 41 mg/dL      Total Cholesterol: 208 mg/dL      Recent Labs   Lab Test  06/15/18   0837  12/15/15   0730  02/04/15   0742   09/05/13   0724   CHOL  208*  130  181   --   207*   HDL  41  44  37*   --   33*   LDL  111*  63  90   --   Cannot estimate LDL when triglyceride exceeds 400 mg/dL  80   TRIG  279*  114  269*   < >  461*   CHOLHDLRATIO   --    --   4.9   --   6.4*    < > = values in this interval not displayed.       Obesity -   Evaluation and treatment:    declined referral to  "nutrition.    Discussed diet and exercise.     TSH     Date   Value   Ref Range   Status     2/4/2015   1.15    0.40 - 4.00 mU/L   Final     Effective 7/30/2014, the reference range for this assay has changed to reflect  new instrumentation/methodology.      Wt Readings from Last 5 Encounters:   06/19/18 204 lb (92.5 kg)   11/03/17 197 lb (89.4 kg)   03/24/17 195 lb (88.5 kg)   06/21/16 189 lb (85.7 kg)   12/22/15 199 lb (90.3 kg)     Shoulder pain - at the end of our visit, as I was handing out the AVS, he brought up bilateral shoulder pain. This is mild and without h/o trauma. The pain prevents him from raising the arms sometimes. No numbness. No neck pain.   Evaluation and treatment:    I showed him some rotator cuff strengthening exercises.   If not better in 6 weeks, he should let me know.    Preventive -     Immunization History   Administered Date(s) Administered     Influenza (IIV3) PF 01/12/2015     TDAP Vaccine (Adacel) 12/16/2011       ROS:    Const: No fevers or night sweats recently.  ENT: No runny nose, sore throat or ear pain.  Resp: No cough or shortness of breath.  CV: No chest pain, dizziness or cardiac palpitations.  GI: No nausea, vomiting, diarrhea or constipation. Denies blood in stools or black stools.  : No dysuria, frequency or hematuria.    SH:    Marital status: marrried  Kids: 4  Employment: computer programming  Exercise: Tae Marnie Do 2-3 per week, some running  Tobacco: no  Etoh: 1 per month  Recreational drugs: no  Caffeine: one per day but now nothing    FH:    Unknown if there is HTN.      Exam:    /84 (Cuff Size: Adult Regular)  Pulse 65  Temp 98.7  F (37.1  C) (Oral)  Resp 14  Ht 5' 10\" (1.778 m)  Wt 204 lb (92.5 kg)  SpO2 98%  BMI 29.27 kg/m2    Gen: Healthy appearing male in no acute distress  Neck: No enlarged lymph nodes, thyromegally or other masses.  Lungs: Good air movement and otherwise clear.  CV: Heart RRR with no murmurs. No JVD, carotid bruits or leg " edema.  MS: The shoulders appear normal by inspection. There is no tenderness at the AC joint or the biceps tendon. There is no tenderness at the subacromial area. There is full ROM actively and passively. Strength of the rotator cuff muscles is preserved. There is mildly positive Jenkins and Drop arm tests.    Assessment and Plan - Decision Making    1. Essential hypertension with goal blood pressure less than 140/90  Per HPI  - lisinopril-hydrochlorothiazide (PRINZIDE/ZESTORETIC) 10-12.5 MG per tablet; Take 1 tablet by mouth daily  Dispense: 90 tablet; Refill: 3    2. Bilateral shoulder pain, unspecified chronicity  Per HPI      Written instructions given as follows:    Patient Instructions   1. No need to restart the Fish Oil or Fenofibrate.    2. Continue your Lisinopril/HCTZ daily.    3. If all is well, see you back in one year for a physical with fasting labs.

## 2018-06-19 ENCOUNTER — OFFICE VISIT (OUTPATIENT)
Dept: FAMILY MEDICINE | Facility: CLINIC | Age: 43
End: 2018-06-19
Payer: COMMERCIAL

## 2018-06-19 VITALS
OXYGEN SATURATION: 98 % | HEART RATE: 65 BPM | SYSTOLIC BLOOD PRESSURE: 131 MMHG | BODY MASS INDEX: 29.2 KG/M2 | RESPIRATION RATE: 14 BRPM | HEIGHT: 70 IN | DIASTOLIC BLOOD PRESSURE: 84 MMHG | TEMPERATURE: 98.7 F | WEIGHT: 204 LBS

## 2018-06-19 DIAGNOSIS — M25.511 BILATERAL SHOULDER PAIN, UNSPECIFIED CHRONICITY: ICD-10-CM

## 2018-06-19 DIAGNOSIS — I10 ESSENTIAL HYPERTENSION WITH GOAL BLOOD PRESSURE LESS THAN 140/90: Primary | ICD-10-CM

## 2018-06-19 DIAGNOSIS — M25.512 BILATERAL SHOULDER PAIN, UNSPECIFIED CHRONICITY: ICD-10-CM

## 2018-06-19 PROCEDURE — 99214 OFFICE O/P EST MOD 30 MIN: CPT | Performed by: FAMILY MEDICINE

## 2018-06-19 RX ORDER — LISINOPRIL/HYDROCHLOROTHIAZIDE 10-12.5 MG
1 TABLET ORAL DAILY
Qty: 90 TABLET | Refills: 3 | Status: SHIPPED | OUTPATIENT
Start: 2018-06-19 | End: 2019-06-17

## 2018-06-19 NOTE — MR AVS SNAPSHOT
After Visit Summary   6/19/2018    Jose Hernandez    MRN: [id number]           Patient Information     Date Of Birth          1975        Visit Information        Provider Department      6/19/2018 3:10 PM Silverio Mcpherson MD [facility]        Today's Diagnoses     Essential hypertension with goal blood pressure less than 140/90    -  1      Care Instructions    1. No need to restart the Fish Oil or Fenofibrate.    2. Continue your Lisinopril/HCTZ daily.    3. If all is well, see you back in one year for a physical with fasting labs.          Follow-ups after your visit        Who to contact     If you have questions or need follow up information about today's clinic visit or your schedule please contact [facility] directly at [phone].  Normal or non-critical lab and imaging results will be communicated to you by MyChart, letter or phone within 4 business days after the clinic has received the results. If you do not hear from us within 7 days, please contact the clinic through [employer] or phone. If you have a critical or abnormal lab result, we will notify you by phone as soon as possible.  Submit refill requests through [employer] or call your pharmacy and they will forward the refill request to us. Please allow 3 business days for your refill to be completed.          Additional Information About Your Visit        MyChart Information     [employer] gives you secure access to your electronic health record. If you see a primary care provider, you can also send messages to your care team and make appointments. If you have questions, please call your primary care clinic.  If you do not have a primary care provider, please call [phone] and they will assist you.        Care EveryWhere ID     This is your Care EveryWhere ID. This could be used by other organizations to access your [facility] medical records  [id number]        Your Vitals Were     Pulse Temperature  "Respirations Height Pulse Oximetry BMI (Body Mass Index)    65 98.7  F (37.1  C) (Oral) 14 5' 10\" (1.778 m) 98% 29.27 kg/m2       Blood Pressure from Last 3 Encounters:   06/19/18 131/84   11/03/17 139/85   03/24/17 136/88    Weight from Last 3 Encounters:   06/19/18 204 lb (92.5 kg)   11/03/17 197 lb (89.4 kg)   03/24/17 195 lb (88.5 kg)              Today, you had the following     No orders found for display         Today's Medication Changes          These changes are accurate as of 6/19/18  3:35 PM.  If you have any questions, ask your nurse or doctor.               These medicines have changed or have updated prescriptions.        Dose/Directions    * lisinopril-hydrochlorothiazide 10-12.5 MG per tablet   Commonly known as:  PRINZIDE/ZESTORETIC   This may have changed:  Another medication with the same name was added. Make sure you understand how and when to take each.   Used for:  Essential hypertension with goal blood pressure less than 140/90   Changed by:  Silveiro Mcpherson MD        TAKE ONE TABLET BY MOUTH DAILY   Quantity:  10 tablet   Refills:  0       * lisinopril-hydrochlorothiazide 10-12.5 MG per tablet   Commonly known as:  PRINZIDE/ZESTORETIC   This may have changed:  You were already taking a medication with the same name, and this prescription was added. Make sure you understand how and when to take each.   Used for:  Essential hypertension with goal blood pressure less than 140/90   Changed by:  Silverio Mcpherson MD        Dose:  1 tablet   Take 1 tablet by mouth daily   Quantity:  90 tablet   Refills:  3       * Notice:  This list has 2 medication(s) that are the same as other medications prescribed for you. Read the directions carefully, and ask your doctor or other care provider to review them with you.         Where to get your medicines      These medications were sent to [place] [address] [place] [address] [address]     Phone:  " [phone]     lisinopril-hydrochlorothiazide 10-12.5 MG per tablet                Primary Care Provider Office Phone # Fax #    Silverio Mcpherson [phone] [address] [facility] [address]        Equal Access to Services     ANTWON MONSIVAIS : Hadii aad ku hadasho Soomaali, waaxda luqadaha, qaybta kaalmada adeegyada, waxay malikain hayangelikan conor quinterojosefinaeleazar bee. So [facility] [phone].    ATENCIÓN: Si habla español, tiene a farrell disposición servicios gratuitos de asistencia lingüística. [facility] [phone].    We comply with applicable federal civil rights laws and [place] laws. We do not discriminate on the basis of race, color, national origin, age, disability, sex, sexual orientation, or gender identity.            Thank you!     Thank you for choosing [facility]  for your care. Our goal is always to provide you with excellent care. Hearing back from our patients is one way we can continue to improve our services. Please take a few minutes to complete the written survey that you may receive in the mail after your visit with us. Thank you!             Your Updated Medication List - Protect others around you: Learn how to safely use, store and throw away your medicines at www.disposemymeds.org.          This list is accurate as of 6/19/18  3:35 PM.  Always use your most recent med list.                   Brand Name Dispense Instructions for use Diagnosis    FISH OIL      3 g daily.        * lisinopril-hydrochlorothiazide 10-12.5 MG per tablet    PRINZIDE/ZESTORETIC    10 tablet    TAKE ONE TABLET BY MOUTH DAILY    Essential hypertension with goal blood pressure less than 140/90       * lisinopril-hydrochlorothiazide 10-12.5 MG per tablet    PRINZIDE/ZESTORETIC    90 tablet    Take 1 tablet by mouth daily    Essential hypertension with goal blood pressure less than 140/90       MULTI VITAMIN MENS PO      Take  by mouth.        Vitamin C 500 MG Caps           * Notice:  This  list has 2 medication(s) that are the same as other medications prescribed for you. Read the directions carefully, and ask your doctor or other care provider to review them with you.

## 2018-06-19 NOTE — PATIENT INSTRUCTIONS
1. No need to restart the Fish Oil or Fenofibrate.    2. Continue your Lisinopril/HCTZ daily.    3. If all is well, see you back in one year for a physical with fasting labs.

## 2018-06-19 NOTE — NURSING NOTE
"Chief Complaint   Patient presents with     Hypertension       Initial /84 (Cuff Size: Adult Regular)  Pulse 65  Temp 98.7  F (37.1  C) (Oral)  Resp 14  Ht 5' 10\" (1.778 m)  Wt 204 lb (92.5 kg)  SpO2 98%  BMI 29.27 kg/m2 Estimated body mass index is 29.27 kg/(m^2) as calculated from the following:    Height as of this encounter: 5' 10\" (1.778 m).    Weight as of this encounter: 204 lb (92.5 kg).      Rebecca Salcido LPN    "

## 2019-06-17 DIAGNOSIS — I10 ESSENTIAL HYPERTENSION WITH GOAL BLOOD PRESSURE LESS THAN 140/90: ICD-10-CM

## 2019-06-17 RX ORDER — LISINOPRIL/HYDROCHLOROTHIAZIDE 10-12.5 MG
TABLET ORAL
Qty: 30 TABLET | Refills: 0 | Status: SHIPPED | OUTPATIENT
Start: 2019-06-17 | End: 2019-07-24

## 2019-06-17 NOTE — TELEPHONE ENCOUNTER
Lisinopril - hydrochlorothiazide refill request  Last OV Dr. Mcpherson was 6/19/18  Last refill at that visit x 1 year.  30 day refill given.  :;  Please let patient know due now for office visit for blood pressure/hypertension.

## 2019-06-17 NOTE — LETTER
June 17, 2019    Jose Hernandez  [address] [facility] [address]    Dear Jose,       We recently received a refill request for lisinopril-hydrochlorothiazide (PRINZIDE/ZESTORETIC) 10-12.5 MG tablet.  We have refilled this for a one time 30 day supply only because you are due for a:    Hypertension/medication check office visit and fasting lab appointment      Please schedule this lab appointment 4-5 days prior to the office visit.     Please call at your earliest convenience so that there will not be a delay with your future refills.          Thank you,   Your [facility] Team/  [phone]

## 2019-07-24 DIAGNOSIS — I10 ESSENTIAL HYPERTENSION WITH GOAL BLOOD PRESSURE LESS THAN 140/90: ICD-10-CM

## 2019-07-24 RX ORDER — LISINOPRIL/HYDROCHLOROTHIAZIDE 10-12.5 MG
1 TABLET ORAL DAILY
Qty: 30 TABLET | Refills: 0 | Status: SHIPPED | OUTPATIENT
Start: 2019-07-24 | End: 2019-09-01

## 2019-07-24 NOTE — TELEPHONE ENCOUNTER
"Requested Prescriptions   Pending Prescriptions Disp Refills     lisinopril-hydrochlorothiazide (PRINZIDE/ZESTORETIC) 10-12.5 MG tablet [Pharmacy Med Name: LISINOPRIL-HCTZ 10-12.5 MG TAB] 30 tablet 0     Sig: TAKE 1 TABLET BY MOUTH EVERY DAY       Diuretics (Including Combos) Protocol Failed - 7/24/2019  1:12 AM        Failed - Blood pressure under 140/90 in past 12 months     BP Readings from Last 3 Encounters:   06/19/18 131/84   11/03/17 139/85   03/24/17 136/88                 Failed - Recent (12 mo) or future (30 days) visit within the authorizing provider's specialty     Patient had office visit in the last 12 months or has a visit in the next 30 days with authorizing provider or within the authorizing provider's specialty.  See \"Patient Info\" tab in inbasket, or \"Choose Columns\" in Meds & Orders section of the refill encounter.              Failed - Normal serum creatinine on file in past 12 months     Recent Labs   Lab Test 06/15/18  0837   CR 1.04              Failed - Normal serum potassium on file in past 12 months     Recent Labs   Lab Test 06/15/18  0837   POTASSIUM 3.8                    Failed - Normal serum sodium on file in past 12 months     Recent Labs   Lab Test 06/15/18  0837              "

## 2019-08-13 ENCOUNTER — OFFICE VISIT (OUTPATIENT)
Dept: URGENT CARE | Facility: URGENT CARE | Age: 44
End: 2019-08-13
Payer: COMMERCIAL

## 2019-08-13 ENCOUNTER — ANCILLARY PROCEDURE (OUTPATIENT)
Dept: GENERAL RADIOLOGY | Facility: CLINIC | Age: 44
End: 2019-08-13
Attending: PHYSICIAN ASSISTANT
Payer: COMMERCIAL

## 2019-08-13 VITALS
OXYGEN SATURATION: 97 % | RESPIRATION RATE: 16 BRPM | HEART RATE: 80 BPM | SYSTOLIC BLOOD PRESSURE: 121 MMHG | DIASTOLIC BLOOD PRESSURE: 82 MMHG

## 2019-08-13 DIAGNOSIS — S89.92XA KNEE INJURY, LEFT, INITIAL ENCOUNTER: ICD-10-CM

## 2019-08-13 DIAGNOSIS — S89.92XA KNEE INJURY, LEFT, INITIAL ENCOUNTER: Primary | ICD-10-CM

## 2019-08-13 PROCEDURE — 73562 X-RAY EXAM OF KNEE 3: CPT | Mod: LT

## 2019-08-13 PROCEDURE — 99213 OFFICE O/P EST LOW 20 MIN: CPT | Performed by: PHYSICIAN ASSISTANT

## 2019-08-14 NOTE — PROGRESS NOTES
S: 45 yo male is here for left knee injury that occurred tonight while playing volleyball.  As needed as he was coming down from a jump someone hit his lateral knee but his foot stayed in position.  He felt a sudden pop and pain in his knee.  He was unable to bear weight.  Not diabetic.  No numbness or tingling.  Did take a few ibuprofen which she feels has helped.       No Known Allergies    No past medical history on file.      Current Outpatient Medications on File Prior to Visit:  Ascorbic Acid (VITAMIN C) 500 MG CAPS    FISH OIL 3 g daily.   lisinopril-hydrochlorothiazide (PRINZIDE/ZESTORETIC) 10-12.5 MG per tablet TAKE ONE TABLET BY MOUTH DAILY   lisinopril-hydrochlorothiazide (PRINZIDE/ZESTORETIC) 10-12.5 MG tablet Take 1 tablet by mouth daily Needs office visit for further refill.   Multiple Vitamin (MULTI VITAMIN MENS PO) Take  by mouth.     No current facility-administered medications on file prior to visit.     Social History     Tobacco Use     Smoking status: Never Smoker     Smokeless tobacco: Never Used   Substance Use Topics     Alcohol use: Yes     Comment: rarely       ROS:  CONSTITUTIONAL: Negative for fatigue or fever.  EYES: Negative for eye problems.  ENT: neg.  RESP: neg.  CV: Negative for chest pains.  GI: Negative for vomiting.  MUSCULOSKELETAL:  As above  NEUROLOGIC: Negative for headaches.  SKIN: Negative for rash.  Psych- nl mentation    OBJECTIVE:  /82   Pulse 80   Resp 16   SpO2 97%   GENERAL APPEARANCE: Healthy, alert and no distress.  EYES:Conjunctiva/sclera clear.  RESP: Lungs clear to auscultation - no rales, rhonchi or wheezes  CV: Regular rate and rhythm, normal S1 S2, no murmur noted.  NEURO: Awake, alert    SKIN: No rashes  Left knee-no obvious effusion, swelling or ecchymosis.  No joint line tenderness to palpation although he states he has pain along the entire joint line bilaterally.  I do feel there is laxity with drawer/Lachman.  Extension 160 degrees.  Mild to  moderate decreased flexion.  Negative patellar grinding test.  Circulation/sensation intact.    Study Result     KNEE LEFT THREE VIEW   8/13/2019 8:43 PM      HISTORY:  Knee injury, left, initial encounter.                                                                      IMPRESSION: Small effusion. The bones, joint spaces and alignment  appear normal. There is no evidence of fracture or calcified  intra-articular body. Otherwise unremarkable.         ASSESSMENT:       ICD-10-CM    1. Knee injury, left, initial encounter S89.92XA XR Knee Left 3 Views     order for DME     order for DME     ORTHO  REFERRAL         PLAN: Possible meniscal tear versus cruciate ligament injury.  Ice, elevate, ibuprofen.  Nonweightbearing for at least 3 days.  Crutches and knee immobilizer issued here.  Follow-up with Ortho 5 days.    Serena Tejada PA-C

## 2019-08-17 ENCOUNTER — OFFICE VISIT (OUTPATIENT)
Dept: ORTHOPEDICS | Facility: CLINIC | Age: 44
End: 2019-08-17
Payer: COMMERCIAL

## 2019-08-17 VITALS
DIASTOLIC BLOOD PRESSURE: 78 MMHG | SYSTOLIC BLOOD PRESSURE: 120 MMHG | HEIGHT: 70 IN | WEIGHT: 210 LBS | BODY MASS INDEX: 30.06 KG/M2

## 2019-08-17 DIAGNOSIS — S89.92XA INJURY OF LEFT KNEE, INITIAL ENCOUNTER: Primary | ICD-10-CM

## 2019-08-17 PROCEDURE — 99204 OFFICE O/P NEW MOD 45 MIN: CPT | Performed by: PEDIATRICS

## 2019-08-17 ASSESSMENT — MIFFLIN-ST. JEOR: SCORE: 1843.8

## 2019-08-17 NOTE — PATIENT INSTRUCTIONS
Icing, over the counter medication as needed  Crutches are as desired  May use compression/support if desired  MRI order placed  Will call with results of MRI       Advanced imaging is done by appointment. Some insurance require a prior authorization to be completed which may delay the time until you are able to schedule your appointment.Please call [place] [place] [facility] [place]: [phone] to schedule your MRi.  Depending on your availability you can usually schedule within the next 1-2 days.    The clinic will call you with results, if you have not heard from the clinic within 3-4 days following your MRI please contact us at the number listed below.   If you have any further questions for your physician or physician s care team you can call [phone] and use option 3 to leave a voice message. Calls received during business hours will be returned same day.        For questions about the cost of your visit, or the cost of any procedure, lab or imaging study, please contact our [place] PRICE LINE at [phone] for an estimate.  You may also contact them at [email or web address]     You will be asked to provide your name, birthdate, address, phone number, and insurance information.  You will also need to know the name of any specific test or procedure which is planned.  This often requires the CPT (common procedural terminology) code for the test or procedure.  Our clinic staff can help you get that information, if needed.    For information about how your insurance will cover your clinic visit, please call the customer service number on your insurance card.

## 2019-08-17 NOTE — PROGRESS NOTES
Sports Medicine Clinic Visit    PCP: Silverio Mcpherson    Jose Hernandez is a 44 year old male who is seen  in consultation at the request of  Serena Tejada PA-C presenting with a left knee injury, while playing sand volleyball on 8/13/19, someone fell behind him and he twisted his knee as he got out of the way.  His foot stayed planted in the sand and he felt a pop in his knee.  Was unable to weight bear immediately afterwards.  He was seen and did have x rays done.   Currently in a wrap around knee brace.  Pain is diffuse.      Injury: twisted knee with foot planted in sand   **  Hit in lateral knee, describes valgus stress.  Has had a couple pops since that time.  Overall improving somewhat.  Stopped crutches yesterday.  No pain at rest currently seated in clinic.  Notes swelling, including posteriorly.    Location of Pain: left knee  Duration of Pain: 4 day(s)  Rating of Pain at worst: 10/10  Rating of Pain Currently: 2/10  Symptoms are better with: Rest  Symptoms are worse with: twisting motions, fast movements   Additional Features:   Positive: swelling, popping and instability   Negative: bruising, grinding, catching, locking, paresthesias, numbness, weakness, pain in other joints and systemic symptoms  Other evaluation and/or treatments so far consists of: x rays   Prior History of related problems: denies     Social History: IT    Review of Systems  Musculoskeletal: as above  Remainder of review of systems is negative including constitutional, CV, pulmonary, GI, Skin and Neurologic except as noted in HPI or medical history.    Patient Active Problem List   Diagnosis     Hyperlipidemia LDL goal <130     Obesity     HDL deficiency     Hypertriglyceridemia     Elevated liver enzymes     Essential hypertension with goal blood pressure less than 140/90     Dyslipidemia     PMHx: above    Past Surgical History:   Procedure Laterality Date     APPENDECTOMY  around 2001     Family History   Problem Relation Age of  "Onset     Lipids Mother      Diabetes Father      Lipids Father      Social History     Socioeconomic History     Marital status:      Spouse name: Not on file     Number of children: Not on file     Years of education: Not on file     Highest education level: Not on file   Occupational History     Not on file   Social Needs     Financial resource strain: Not on file     Food insecurity:     Worry: Not on file     Inability: Not on file     Transportation needs:     Medical: Not on file     Non-medical: Not on file   Tobacco Use     Smoking status: Never Smoker     Smokeless tobacco: Never Used   Substance and Sexual Activity     Alcohol use: Yes     Comment: rarely     Drug use: No     Sexual activity: Yes     Partners: Female   Lifestyle     Physical activity:     Days per week: Not on file     Minutes per session: Not on file     Stress: Not on file   Relationships     Social connections:     Talks on phone: Not on file     Gets together: Not on file     Attends Jainism service: Not on file     Active member of club or organization: Not on file     Attends meetings of clubs or organizations: Not on file     Relationship status: Not on file     Intimate partner violence:     Fear of current or ex partner: Not on file     Emotionally abused: Not on file     Physically abused: Not on file     Forced sexual activity: Not on file   Other Topics Concern     Parent/sibling w/ CABG, MI or angioplasty before 65F 55M? Not Asked   Social History Narrative     Not on file       Objective  /78   Ht 1.778 m (5' 10\")   Wt 95.3 kg (210 lb)   BMI 30.13 kg/m      GENERAL APPEARANCE: healthy, alert and no distress   GAIT: slight antalgic   SKIN: no suspicious lesions or rashes  NEURO: Normal strength and tone, mentation intact and speech normal  PSYCH:  mentation appears normal and affect normal/bright  HEENT: no scleral icterus  CV: distal perfusion intact  RESP: nonlabored breathing    Left Knee exam    ROM:     "    Flexion 100 degrees       Extension 10 degrees actively, full passive    Inspection:       no visible ecchymosis        effusion noted mild-mod    Skin:       no visible deformities       well perfused       capillary refill brisk    Patellar Motion:        Normal patellar tracking noted through range of motion    Tender:        Fullness popliteal space    Non Tender:         remainder of knee area        medial patellar border        lateral patellar border        medial joint line        lateral joint line    Special Tests:        neg (-) Ann       positive (+) Lachman, guarding       equivocal anterior drawer, guarding       neg (-) posterior drawer       neg (-) varus       neg (-) valgus       no pain with forced extension      Radiology  Visualized radiographs of left knee 8/13/19, and reviewed the images with the patient.  Impression: effusion, no clear fracture noted.      Results for orders placed or performed in visit on 08/13/19   XR Knee Left 3 Views    Narrative    KNEE LEFT THREE VIEW   8/13/2019 8:43 PM     HISTORY:  Knee injury, left, initial encounter.      Impression    IMPRESSION: Small effusion. The bones, joint spaces and alignment  appear normal. There is no evidence of fracture or calcified  intra-articular body. Otherwise unremarkable.    FRANK LUNDBERG MD         Assessment:  1. Injury of left knee, initial encounter        Plan:  Discussed the assessment with the patient. Concern for internal derangement.  Topical Treatments: Ice prn  Over the counter medication: Patient's preferred OTC medication prn  MRI of the knee next  Activity Modification: discussed rest from activities  Medical Equipment: has crutches and knee support, may continue  Follow up: call with MRI results. Future considerations discussed potential for PT, also referral to ortho surgeon.  Questions answered. The patient indicates understanding of these issues and agrees with the plan.    Juan C Sullivan DO,  CAQ    CC: Serena Tejada            Disclaimer: This note consists of symbols derived from keyboarding, dictation and/or voice recognition software. As a result, there may be errors in the script that have gone undetected. Please consider this when interpreting information found in this chart.

## 2019-08-17 NOTE — LETTER
8/17/2019         RE: Jose Hernandez  [address]  [place] [address]        Dear Colleague,    Thank you for referring your patient, Jose Hernandez, to the [facility]. Please see a copy of my visit note below.    Sports Medicine Clinic Visit    PCP: Silverio Mcpherson    Jose Hernandez is a 44 year old male who is seen  in consultation at the request of  Serena Tejada PA-C presenting with a left knee injury, while playing sand volleyball on 8/13/19, someone fell behind him and he twisted his knee as he got out of the way.  His foot stayed planted in the sand and he felt a pop in his knee.  Was unable to weight bear immediately afterwards.  He was seen and did have x rays done.   Currently in a wrap around knee brace.  Pain is diffuse.      Injury: twisted knee with foot planted in sand   **  Hit in lateral knee, describes valgus stress.  Has had a couple pops since that time.  Overall improving somewhat.  Stopped crutches yesterday.  No pain at rest currently seated in clinic.  Notes swelling, including posteriorly.    Location of Pain: left knee  Duration of Pain: 4 day(s)  Rating of Pain at worst: 10/10  Rating of Pain Currently: 2/10  Symptoms are better with: Rest  Symptoms are worse with: twisting motions, fast movements   Additional Features:   Positive: swelling, popping and instability   Negative: bruising, grinding, catching, locking, paresthesias, numbness, weakness, pain in other joints and systemic symptoms  Other evaluation and/or treatments so far consists of: x rays   Prior History of related problems: denies     Social History: IT    Review of Systems  Musculoskeletal: as above  Remainder of review of systems is negative including constitutional, CV, pulmonary, GI, Skin and Neurologic except as noted in HPI or medical history.    Patient Active Problem List   Diagnosis     Hyperlipidemia LDL goal <130     Obesity     HDL deficiency     Hypertriglyceridemia  [facility] Progress Note Date: 2019 Name: Bela Holm MRN: [id number] : 1951 Invanz/Dapto In[facility] 
  
Ms. Hewitt to [facility], ambulatory without walker at 0905. Pt was assessed and education was provided.  
  
Ms. Hewitt's vitals were reviewed. Visit Vitals /84 (BP 1 Location: Right arm, BP Patient Position: Sitting) Pulse (!) 113 Temp 97.8 °F (36.6 °C) Resp 18 SpO2 97%  
  
  
Right chest mediport accessed on 19 is clean, dry and intact. Flushes well with good blood return. 
  
  []  Vancomycin  
  [x]  Invanz 1G [x]  Cubicin 350 mg  
  []  Rocephin Invanz over 30 minutes, followed by and Daptomycin slow push over 2-3 minutes. Port site dressing and salgado remains dry and intact and s signs/symptoms. Ms. Jenn Pedersen tolerated infusion, and had no complaints.  
  
Mediport flushed with NS 20 ml and Heparin 500 units. Port remained accessed per pt preference for remaining infusions this week. Patient armband removed and shredded. 
  
Ms. Hewitt was discharged from [place] [id number] in stable condition at 0950. She is to return on 19  for her next antibiotic appointment. 
  
Germania Carver RN 2019 "    Elevated liver enzymes     Essential hypertension with goal blood pressure less than 140/90     Dyslipidemia     PMHx: above    Past Surgical History:   Procedure Laterality Date     APPENDECTOMY  around 2001     Family History   Problem Relation Age of Onset     Lipids Mother      Diabetes Father      Lipids Father      Social History     Socioeconomic History     Marital status:      Spouse name: Not on file     Number of children: Not on file     Years of education: Not on file     Highest education level: Not on file   Occupational History     Not on file   Social Needs     Financial resource strain: Not on file     Food insecurity:     Worry: Not on file     Inability: Not on file     Transportation needs:     Medical: Not on file     Non-medical: Not on file   Tobacco Use     Smoking status: Never Smoker     Smokeless tobacco: Never Used   Substance and Sexual Activity     Alcohol use: Yes     Comment: rarely     Drug use: No     Sexual activity: Yes     Partners: Female   Lifestyle     Physical activity:     Days per week: Not on file     Minutes per session: Not on file     Stress: Not on file   Relationships     Social connections:     Talks on phone: Not on file     Gets together: Not on file     Attends Oriental orthodox service: Not on file     Active member of club or organization: Not on file     Attends meetings of clubs or organizations: Not on file     Relationship status: Not on file     Intimate partner violence:     Fear of current or ex partner: Not on file     Emotionally abused: Not on file     Physically abused: Not on file     Forced sexual activity: Not on file   Other Topics Concern     Parent/sibling w/ CABG, MI or angioplasty before 65F 55M? Not Asked   Social History Narrative     Not on file       Objective  /78   Ht 1.778 m (5' 10\")   Wt 95.3 kg (210 lb)   BMI 30.13 kg/m       GENERAL APPEARANCE: healthy, alert and no distress   GAIT: slight antalgic   SKIN: no suspicious " lesions or rashes  NEURO: Normal strength and tone, mentation intact and speech normal  PSYCH:  mentation appears normal and affect normal/bright  HEENT: no scleral icterus  CV: distal perfusion intact  RESP: nonlabored breathing    Left Knee exam    ROM:        Flexion 100 degrees       Extension 10 degrees actively, full passive    Inspection:       no visible ecchymosis        effusion noted mild-mod    Skin:       no visible deformities       well perfused       capillary refill brisk    Patellar Motion:        Normal patellar tracking noted through range of motion    Tender:        Fullness popliteal space    Non Tender:         remainder of knee area        medial patellar border        lateral patellar border        medial joint line        lateral joint line    Special Tests:        neg (-) Ann       positive (+) Lachman, guarding       equivocal anterior drawer, guarding       neg (-) posterior drawer       neg (-) varus       neg (-) valgus       no pain with forced extension      Radiology  Visualized radiographs of left knee 8/13/19, and reviewed the images with the patient.  Impression: effusion, no clear fracture noted.      Results for orders placed or performed in visit on 08/13/19   XR Knee Left 3 Views    Narrative    KNEE LEFT THREE VIEW   8/13/2019 8:43 PM     HISTORY:  Knee injury, left, initial encounter.      Impression    IMPRESSION: Small effusion. The bones, joint spaces and alignment  appear normal. There is no evidence of fracture or calcified  intra-articular body. Otherwise unremarkable.    FRANK LUNDBERG MD         Assessment:  1. Injury of left knee, initial encounter        Plan:  Discussed the assessment with the patient. Concern for internal derangement.  Topical Treatments: Ice prn  Over the counter medication: Patient's preferred OTC medication prn  MRI of the knee next  Activity Modification: discussed rest from activities  Medical Equipment: has crutches and knee  support, may continue  Follow up: call with MRI results. Future considerations discussed potential for PT, also referral to ortho surgeon.  Questions answered. The patient indicates understanding of these issues and agrees with the plan.    Juan C Sullivan DO, EVELINE    CC: Serena Tejada            Disclaimer: This note consists of symbols derived from keyboarding, dictation and/or voice recognition software. As a result, there may be errors in the script that have gone undetected. Please consider this when interpreting information found in this chart.        Again, thank you for allowing me to participate in the care of your patient.        Sincerely,        Juan C Sullivan DO

## 2019-08-20 ENCOUNTER — ANCILLARY PROCEDURE (OUTPATIENT)
Dept: MRI IMAGING | Facility: CLINIC | Age: 44
End: 2019-08-20
Attending: PEDIATRICS
Payer: COMMERCIAL

## 2019-08-20 DIAGNOSIS — S89.92XA INJURY OF LEFT KNEE, INITIAL ENCOUNTER: ICD-10-CM

## 2019-08-20 PROCEDURE — 73721 MRI JNT OF LWR EXTRE W/O DYE: CPT | Mod: TC

## 2019-08-23 ENCOUNTER — TELEPHONE (OUTPATIENT)
Dept: ORTHOPEDICS | Facility: CLINIC | Age: 44
End: 2019-08-23

## 2019-08-23 DIAGNOSIS — S83.512A RUPTURE OF ANTERIOR CRUCIATE LIGAMENT OF LEFT KNEE, INITIAL ENCOUNTER: ICD-10-CM

## 2019-08-23 DIAGNOSIS — S83.212A BUCKET-HANDLE TEAR OF MEDIAL MENISCUS OF LEFT KNEE AS CURRENT INJURY, INITIAL ENCOUNTER: Primary | ICD-10-CM

## 2019-08-23 NOTE — TELEPHONE ENCOUNTER
Patient LVM requesting a call back regarding his MRI results (exam on Tuesday 8/21/19)    Please call to discuss    # [phone]

## 2019-08-23 NOTE — TELEPHONE ENCOUNTER
Results for orders placed or performed in visit on 08/20/19   MR Knee Left w/o Contrast    Narrative    MR LEFT KNEE WITHOUT CONTRAST 8/20/2019 5:43 PM    HISTORY:  Injury to left knee one week ago. Hit from the left side.  Medial-sided pain.     TECHNIQUE:  Axial and coronal T2 with fat suppression. Coronal T1.  Sagittal dual echo T2.    COMPARISON: None.    FINDINGS:   Medial Meniscus: Extensive longitudinal tear throughout the medial  meniscus with displacement of a bucket-handle type flap into the  medial aspect of the intercondylar notch. In addition, there is a  full-thickness radial-type tear at the far posterior horn (image 22 of  series 6).       Lateral Meniscus: No tear, displaced fragment, or extrusion.        Anterior Cruciate Ligament: Acute-appearing complete tear of the  anterior cruciate ligament at the femoral attachment.     Posterior Cruciate Ligament: Unremarkable.     Medial Collateral Ligament: Unremarkable.    Lateral Collateral Ligament Complex, Popliteus Tendon: The iliotibial  band, fibular collateral ligament, biceps femoris tendon, and  popliteus tendon are unremarkable.    Osseous and Cartilaginous Structures: Subchondral bone marrow edema at  the posterior aspect of the lateral tibial plateau consistent with a  microtrabecular injury without fracture. Subtle subchondral bone  marrow edema at the anterolateral weightbearing aspect of the lateral  femoral condyle consistent with a minimal microtrabecular injury. This  pattern of bone marrow injuries is commonly seen with complete ACL  tears. No other acute-appearing bony abnormality. Articular cartilages  in the lateral compartment appear normal. There is diffuse grade II  chondromalacia throughout the weightbearing medial compartment,  especially the medial femoral condyle. Subtle subchondral reactive  bone marrow edema is seen in the medial femoral condyle.  Patellofemoral cartilages appear normal.    Extensor Mechanism: The quadriceps  and patellar tendons are  unremarkable. The medial and lateral patellar retinacula are normal.    Joint Space: Moderate-sized joint effusion. No definite loose bodies  appreciated.    Additional Findings: There is a multiloculated popliteal cyst  dissecting superomedially measuring up to 8 cm craniocaudad dimension  and a maximum of 1.7 cm mediolateral dimension and 1 cm anterior  posterior dimension. Fluid-fluid levels in some of the loculated areas  of the cyst may indicate hemorrhagic components. No  semimembranosus-tibial collateral ligament or pes anserine bursitis.  Nonspecific subcutaneous edema anteriorly, inferior to the level of  the knee joint.      Impression    IMPRESSION:   1. Extensive longitudinal tear with displaced bucket-handle type flap  involving the entire medial meniscus. Additional radial tear in the  far posterior horn.  2. Acute complete tear of the anterior cruciate ligament at the  femoral attachment with associated bony injuries as described above.  3. Chondromalacia medial compartment.  4. Joint space effusion and superomedially dissecting popliteal cyst.    ESTELLA PRIDE MD

## 2019-08-26 NOTE — TELEPHONE ENCOUNTER
Called and spoke with patient.  Relayed results.  He is agreeable to referral.  Referral placed.  All questions were answered.   Sue Louise MS ATC

## 2019-08-26 NOTE — TELEPHONE ENCOUNTER
ACL tear and meniscus tear.  Advise he see ortho surgeon next, particularly with the extent of the medial meniscus tear.  Remainder of report is below.  Thanks.  Juan C Sullivan DO, CAQ

## 2019-08-29 ENCOUNTER — OFFICE VISIT (OUTPATIENT)
Dept: ORTHOPEDICS | Facility: CLINIC | Age: 44
End: 2019-08-29
Payer: COMMERCIAL

## 2019-08-29 VITALS
OXYGEN SATURATION: 95 % | HEART RATE: 71 BPM | SYSTOLIC BLOOD PRESSURE: 150 MMHG | BODY MASS INDEX: 30.06 KG/M2 | HEIGHT: 70 IN | WEIGHT: 210 LBS | DIASTOLIC BLOOD PRESSURE: 91 MMHG

## 2019-08-29 DIAGNOSIS — S83.212A BUCKET-HANDLE TEAR OF MEDIAL MENISCUS OF LEFT KNEE AS CURRENT INJURY, INITIAL ENCOUNTER: ICD-10-CM

## 2019-08-29 DIAGNOSIS — S83.512A RUPTURE OF ANTERIOR CRUCIATE LIGAMENT OF LEFT KNEE, INITIAL ENCOUNTER: Primary | ICD-10-CM

## 2019-08-29 PROCEDURE — 99204 OFFICE O/P NEW MOD 45 MIN: CPT | Performed by: ORTHOPAEDIC SURGERY

## 2019-08-29 ASSESSMENT — MIFFLIN-ST. JEOR: SCORE: 1843.8

## 2019-08-29 NOTE — PROGRESS NOTES
"SUBJECTIVE:   Jose Hernandez is a 44 year old male who is seen in consultation at the request of Dr. Sullivan for evaluation of left knee pain that has been present approximately 2 weeks. On 8/13/2019, patient was playing volleyball. A player jumped from behind him and landed on him, causing him to injure the knee. He felt a pop in the knee, had immediate pain and was unable to bear any weight. Since then, symptoms have improved. He reports favoring the knee, but denies pain. Has occasional painful popping in the knee. Pain is diffuse. Also reports stiffness with flexion range of motion. [place]; it was only towards end of the day as he was getting back into his car when he felt some giving way, which \"did not hurt too bad\".    No problems with the knee prior to injury.  Present symptoms: mild pain   Symptoms occur when: prolonged weightbearing  Treatments tried to this point: none    Orthopedic PMH: none    Review of Systems:  Constitutional:  NEGATIVE for fever, chills, change in weight  Integumentary/Skin:  NEGATIVE for worrisome rashes, moles or lesions  Eyes:  NEGATIVE for vision changes or irritation  ENT/Mouth:  NEGATIVE for ear, mouth and throat problems  Resp:  NEGATIVE for significant cough or SOB  CV:  NEGATIVE for chest pain, palpitations or peripheral edema  GI:  NEGATIVE for nausea, abdominal pain, heartburn, or change in bowel habits  :  Negative   Musculoskeletal:  See HPI above  Neuro:  NEGATIVE for weakness, dizziness or paresthesias  Endocrine:  NEGATIVE for temperature intolerance, skin/hair changes  Heme/allergy/immune:  NEGATIVE for bleeding problems  Psychiatric:  NEGATIVE for changes in mood or affect    Past Medical History: Reviewed. No pertinent past medical history.   Past Surgical History:   Past Surgical History:   Procedure Laterality Date     APPENDECTOMY  around 2001     Family History:   Family History   Problem Relation Age of Onset " "    Lipids Mother      Diabetes Father      Lipids Father      Social History:   Social History     Tobacco Use     Smoking status: Never Smoker     Smokeless tobacco: Never Used   Substance Use Topics     Alcohol use: Yes     Comment: rarely     This document serves as a record of the services and decisions personally performed and made by GREGORY Lozano MD. It was created on his behalf by Tiffany Angeles, a trained medical scribe. The creation of this document is based the provider's statements to the medical scribe.    Scribe Tiffany Angeles 3:38 PM 8/29/2019        OBJECTIVE:  Physical Exam:  BP (!) 150/91 (BP Location: Right arm, Patient Position: Sitting, Cuff Size: Adult Regular)   Pulse 71   Ht 1.778 m (5' 10\")   Wt 95.3 kg (210 lb)   SpO2 95%   BMI 30.13 kg/m    General Appearance: healthy, alert and no distress   Skin: no suspicious lesions or rashes  Neuro: Normal strength and tone, mentation intact and speech normal  Vascular: good pulses, and capillary refill   Lymph: no lymphadenopathy   Psych:  mentation appears normal and affect normal/bright  Resp: no increased work of breathing     Left Knee Exam:  Gait: walks with antalgic gait favoring left side   Alignment: normal   Squat: 70 % limited by discomfort .    Patellofemoral joint: mild crepitations in the patellofemoral joint.  Effusion: mild  ROM: 0* extension to 130* flexion  Tender: medial joint line   Ligaments:   Lachman's: Grade 2   Anterior drawer: Grade 2   Posterior drawer: stable,   Varus/Valgus stress: stable to varus and valgus stress    X-rays:  X-rays of the left knee from 8/13/2019 were reviewed in clinic with the patient and are essentially normal.    MRI:    From 8/20/2019 of the left knee showed:  IMPRESSION:   1. Extensive longitudinal tear with displaced bucket-handle type flap  involving the entire medial meniscus. Additional radial tear in the  far posterior horn.  2. Acute complete tear of the anterior cruciate ligament at " the  femoral attachment with associated bony injuries as described above.  3. Chondromalacia medial compartment.  Osseous and Cartilaginous Structures: Subchondral bone marrow edema at  the posterior aspect of the lateral tibial plateau consistent with a  microtrabecular injury without fracture. Subtle subchondral bone  marrow edema at the anterolateral weightbearing aspect of the lateral  femoral condyle consistent with a minimal microtrabecular injury. This  pattern of bone marrow injuries is commonly seen with complete ACL  tears. No other acute-appearing bony abnormality. Articular cartilages  in the lateral compartment appear normal. There is diffuse grade II  chondromalacia throughout the weightbearing medial compartment,  especially the medial femoral condyle. Subtle subchondral reactive  bone marrow edema is seen in the medial femoral condyle.  Patellofemoral cartilages appear normal.  4. Joint space effusion and superomedially dissecting popliteal cyst.     ASSESSMENT:   1. Left knee anterior cruciate ligament rupture  2. Bucket-handle type tear of the left knee medial meniscus    PLAN:   Discussed with patient his anterior cruciate ligament rupture and bucket-handle type medial meniscus tear. Anterior cruciate ligament provided stability of the knee by limiting anterior tibial translation. This is a common knee injury. Various treatment options exist, and an informed autonomous decision is made with respect to how to proceed with treatment. Consideration is made based on age, activity level, occupation and physical demands, as well as symptoms.    * treatment options include nonoperative with Physical Therapy working on range of motion and strengthening, activity modification, and the use of an anterior cruciate ligament stabilizing brace. Surgical options include anterior cruciate ligament reconstruction with either autograft or allograft. Options with autograft include hamstring and bone-patella  tendon-bone. Benefits and disadvantages or each were discussed. Long term studies and meta-analyses do not show a clear benefit with either technique, as both seem to provide good results. Additionally, allograft options were discussed, advantages and disadvantages, including risk of disease transmission.  * Perioperative and post-operative recovery and rehabilitation was discussed.  * risks of any surgery, include: bleeding, infection, pain, scar, damage to adjacent structures such as nerves, vessels and cartilage, temporary versus permanent nerve damage, implant failure, graft failure, recurrent instability, knee stiffness and post-traumatic arthritis, need for further surgery, blood clots, pulmonary embolus, risks of anesthesia and death.  * Understanding the options of treatment, as well as the risks and benefits of each, the patient would like to proceed with surgery.  * will plan for: left knee anterior cruciate ligament reconstruction with quadriceps autograft, with possible medial meniscus repair, outpatient surgery  * patient advised of daily aspirin for a minimum of 2 weeks postoperative, use of crutches and brace until good return of quadriceps strengthening.  * patient to continue with strengthening, range of motion exercises and effusion control prior to surgery. Physical Therapy referral placed.  * will see patient within a week postoperative for wound check, start Physical Therapy per protocol, sooner as needed.  * all the patients questions addressed and answered to satisfaction today. Patient advised to call if questions arise in the meantime.    Return to clinic: 1st post operative visit    The information in this document, created by a scribe for me, accurately reflects the services I personally performed and the decisions made by me. I have reviewed and approved this document for accuracy.      GREGORY Lozano MD  Dept. Orthopedic Surgery  [facility]

## 2019-08-29 NOTE — LETTER
"    8/29/2019         RE: Jose Hernandez  [address]  [place] [address]        Dear Colleague,    Thank you for referring your patient, Jose Hernandez, to the [facility]. Please see a copy of my visit note below.    SUBJECTIVE:   Jose Hernandez is a 44 year old male who is seen in consultation at the request of Dr. Sullivan for evaluation of left knee pain that has been present approximately 2 weeks. On 8/13/2019, patient was playing volleyball. A player jumped from behind him and landed on him, causing him to injure the knee. He felt a pop in the knee, had immediate pain and was unable to bear any weight. Since then, symptoms have improved. He reports favoring the knee, but denies pain. Has occasional painful popping in the knee. Pain is diffuse. Also reports stiffness with flexion range of motion. [place]; it was only towards end of the day as he was getting back into his car when he felt some giving way, which \"did not hurt too bad\".    No problems with the knee prior to injury.  Present symptoms: mild pain   Symptoms occur when: prolonged weightbearing  Treatments tried to this point: none    Orthopedic PMH: none    Review of Systems:  Constitutional:  NEGATIVE for fever, chills, change in weight  Integumentary/Skin:  NEGATIVE for worrisome rashes, moles or lesions  Eyes:  NEGATIVE for vision changes or irritation  ENT/Mouth:  NEGATIVE for ear, mouth and throat problems  Resp:  NEGATIVE for significant cough or SOB  CV:  NEGATIVE for chest pain, palpitations or peripheral edema  GI:  NEGATIVE for nausea, abdominal pain, heartburn, or change in bowel habits  :  Negative   Musculoskeletal:  See HPI above  Neuro:  NEGATIVE for weakness, dizziness or paresthesias  Endocrine:  NEGATIVE for temperature intolerance, skin/hair changes  Heme/allergy/immune:  NEGATIVE for bleeding problems  Psychiatric:  NEGATIVE for changes in mood or " "affect    Past Medical History: Reviewed. No pertinent past medical history.   Past Surgical History:   Past Surgical History:   Procedure Laterality Date     APPENDECTOMY  around 2001     Family History:   Family History   Problem Relation Age of Onset     Lipids Mother      Diabetes Father      Lipids Father      Social History:   Social History     Tobacco Use     Smoking status: Never Smoker     Smokeless tobacco: Never Used   Substance Use Topics     Alcohol use: Yes     Comment: rarely     This document serves as a record of the services and decisions personally performed and made by GREGORY Lozano MD. It was created on his behalf by Tiffany Angeles, a trained medical scribe. The creation of this document is based the provider's statements to the medical scribe.    Scribe Tiffany Angeles 3:38 PM 8/29/2019        OBJECTIVE:  Physical Exam:  BP (!) 150/91 (BP Location: Right arm, Patient Position: Sitting, Cuff Size: Adult Regular)   Pulse 71   Ht 1.778 m (5' 10\")   Wt 95.3 kg (210 lb)   SpO2 95%   BMI 30.13 kg/m     General Appearance: healthy, alert and no distress   Skin: no suspicious lesions or rashes  Neuro: Normal strength and tone, mentation intact and speech normal  Vascular: good pulses, and capillary refill   Lymph: no lymphadenopathy   Psych:  mentation appears normal and affect normal/bright  Resp: no increased work of breathing     Left Knee Exam:  Gait: walks with antalgic gait favoring left side   Alignment: normal   Squat: 70 % limited by discomfort .    Patellofemoral joint: mild crepitations in the patellofemoral joint.  Effusion: mild  ROM: 0* extension to 130* flexion  Tender: medial joint line   Ligaments:   Lachman's: Grade 2   Anterior drawer: Grade 2   Posterior drawer: stable,   Varus/Valgus stress: stable to varus and valgus stress    X-rays:  X-rays of the left knee from 8/13/2019 were reviewed in clinic with the patient and are essentially normal.    MRI:    From 8/20/2019 of the " left knee showed:  IMPRESSION:   1. Extensive longitudinal tear with displaced bucket-handle type flap  involving the entire medial meniscus. Additional radial tear in the  far posterior horn.  2. Acute complete tear of the anterior cruciate ligament at the  femoral attachment with associated bony injuries as described above.  3. Chondromalacia medial compartment.  Osseous and Cartilaginous Structures: Subchondral bone marrow edema at  the posterior aspect of the lateral tibial plateau consistent with a  microtrabecular injury without fracture. Subtle subchondral bone  marrow edema at the anterolateral weightbearing aspect of the lateral  femoral condyle consistent with a minimal microtrabecular injury. This  pattern of bone marrow injuries is commonly seen with complete ACL  tears. No other acute-appearing bony abnormality. Articular cartilages  in the lateral compartment appear normal. There is diffuse grade II  chondromalacia throughout the weightbearing medial compartment,  especially the medial femoral condyle. Subtle subchondral reactive  bone marrow edema is seen in the medial femoral condyle.  Patellofemoral cartilages appear normal.  4. Joint space effusion and superomedially dissecting popliteal cyst.     ASSESSMENT:   1. Left knee anterior cruciate ligament rupture  2. Bucket-handle type tear of the left knee medial meniscus    PLAN:   Discussed with patient his anterior cruciate ligament rupture and bucket-handle type medial meniscus tear. Anterior cruciate ligament provided stability of the knee by limiting anterior tibial translation. This is a common knee injury. Various treatment options exist, and an informed autonomous decision is made with respect to how to proceed with treatment. Consideration is made based on age, activity level, occupation and physical demands, as well as symptoms.    * treatment options include nonoperative with Physical Therapy working on range of motion and strengthening,  activity modification, and the use of an anterior cruciate ligament stabilizing brace. Surgical options include anterior cruciate ligament reconstruction with either autograft or allograft. Options with autograft include hamstring and bone-patella tendon-bone. Benefits and disadvantages or each were discussed. Long term studies and meta-analyses do not show a clear benefit with either technique, as both seem to provide good results. Additionally, allograft options were discussed, advantages and disadvantages, including risk of disease transmission.  * Perioperative and post-operative recovery and rehabilitation was discussed.  * risks of any surgery, include: bleeding, infection, pain, scar, damage to adjacent structures such as nerves, vessels and cartilage, temporary versus permanent nerve damage, implant failure, graft failure, recurrent instability, knee stiffness and post-traumatic arthritis, need for further surgery, blood clots, pulmonary embolus, risks of anesthesia and death.  * Understanding the options of treatment, as well as the risks and benefits of each, the patient would like to proceed with surgery.  * will plan for: left knee anterior cruciate ligament reconstruction with quadriceps autograft, with possible medial meniscus repair, outpatient surgery  * patient advised of daily aspirin for a minimum of 2 weeks postoperative, use of crutches and brace until good return of quadriceps strengthening.  * patient to continue with strengthening, range of motion exercises and effusion control prior to surgery. Physical Therapy referral placed.  * will see patient within a week postoperative for wound check, start Physical Therapy per protocol, sooner as needed.  * all the patients questions addressed and answered to satisfaction today. Patient advised to call if questions arise in the meantime.    Return to clinic: 1st post operative visit    The information in this document, created by a scribe for me,  accurately reflects the services I personally performed and the decisions made by me. I have reviewed and approved this document for accuracy.      GREGORY Lozano MD  Dept. Orthopedic Surgery  [facility]         Again, thank you for allowing me to participate in the care of your patient.        Sincerely,        Philip Lozano MD

## 2019-08-29 NOTE — PATIENT INSTRUCTIONS
Jose to follow up with Primary Care provider regarding elevated blood pressure.        Patient Education     Surgery for Anterior Cruciate Ligament (ACL) Injury  The ACL (anterior cruciate ligament) is a band of tough, fibrous tissue that helps stabilize the knee. Injury to this ligament often happens when the knee is forced beyond its normal range of motion. This can stretch or tear the ligament, much like the fibers of a rope coming apart. Both surgical and nonsurgical treatment has been used to recover from an ACL tear. Several types of surgery are available based on your and your healthcare provider's preferences, as well as other factors. Some surgeons will operate soon after an ACL tear. Others prefer several weeks of physical therapy first. There are also different anesthesia choices available.  Preparing for surgery  Do's and don'ts:    Stop taking aspirin and other blood thinning medicines 7 or more days before surgery as advised by your healthcare provider.    Arrange to get crutches to use during recovery.    Follow any directions you are given for not eating or drinking before surgery.    Arrange for an adult to drive you home after surgery.   After surgery  Here is what to expect:    You ll spend a few hours in a recovery area. You ll have ice on your knee to prevent swelling, and your leg may be in a brace.    You may get a continuous cooling machine to relieve swelling and pain.    You may get medicines to reduce pain and swelling. Take these as prescribed by your healthcare provider.    Depending on the procedure, physical therapy may begin shortly after surgery. This may include light exercises. In some cases, you may use a CPM (continuous passive motion) machine for a time. This machine flexes and extends the knee, keeping it from getting stiff.    You can usually go home the same day as surgery. Have an adult family member or friend give you a ride.  During surgery      The most common type of  surgery for an ACL injury is reconstruction. Several types of surgeries are used:    Patellar tendon graft. This uses a piece of your own patellar tendon between the knee cap and tibia.    Quadriceps tendon graft. This uses a piece of your own quadriceps tendon between the quadriceps muscle and the knee cap.    Hamstring tendon graft. This uses a piece of your own hamstring tendon between the hamstring muscle and the tibia.    A cadaver (allograft) tendon graft. This uses any one of several tendons from a cadaver.  To rebuild your ACL, your surgeon may do open surgery or arthroscopy. During arthroscopy, a long, thin tube with a tiny camera is inserted into the knee joint so your surgeon can see inside the joint. Tools inserted through small incisions are used to repair the joint.  Date Last Reviewed: 5/1/2018 2000-2018 [employer]. [address] [place] [address], [place] [address]. All rights reserved. This information is not intended as a substitute for professional medical care. Always follow your healthcare professional's instructions.

## 2019-09-01 DIAGNOSIS — I10 ESSENTIAL HYPERTENSION WITH GOAL BLOOD PRESSURE LESS THAN 140/90: ICD-10-CM

## 2019-09-04 RX ORDER — LISINOPRIL/HYDROCHLOROTHIAZIDE 10-12.5 MG
TABLET ORAL
Qty: 15 TABLET | Refills: 0 | Status: SHIPPED | OUTPATIENT
Start: 2019-09-04 | End: 2019-09-13

## 2019-09-04 NOTE — TELEPHONE ENCOUNTER
Routing refill request to provider for review/approval because:  Neisha refill given on 7/24/19 and pt did not follow-up. Please advise regarding refill request.    Bety Gutierrez, PATN, RN

## 2019-09-05 ENCOUNTER — TRANSFERRED RECORDS (OUTPATIENT)
Dept: HEALTH INFORMATION MANAGEMENT | Facility: CLINIC | Age: 44
End: 2019-09-05

## 2019-09-12 NOTE — PROGRESS NOTES
[facility]  [address] [facility] [address] [phone]  Dept: [phone]    PRE-OP EVALUATION:  Today's date: 2019    Jose Hernandez (: 1975) presents for pre-operative evaluation assessment as requested by Dr. Wilson.  He requires evaluation and anesthesia risk assessment prior to undergoing surgery/procedure for treatment of left knee pain .    Fax number for surgical facility:   Primary Physician: Silverio Mcpherson  Type of Anesthesia Anticipated: General    Patient has a Health Care Directive or Living Will:  NO    Preop Questions 2019   Who is doing your surgery? dr wilson   What are you having done? knee ACL and meniscus   Date of Surgery/Procedure: [id number]   Facility or Hospital where procedure/surgery will be performed: [facility] surgery center   1.  Do you have a history of Heart attack, stroke, stent, coronary bypass surgery, or other heart surgery? No   2.  Do you ever have any pain or discomfort in your chest? No   3.  Do you have a history of  Heart Failure? No   4.   Are you troubled by shortness of breath when:  walking on a level surface, or up a slight hill, or at night? No   5.  Do you currently have a cold, bronchitis or other respiratory infection? No   6.  Do you have a cough, shortness of breath, or wheezing? No   7.  Do you sometimes get pains in the calves of your legs when you walk? No   8. Do you or anyone in your family have previous history of blood clots? No   9.  Do you or does anyone in your family have a serious bleeding problem such as prolonged bleeding following surgeries or cuts? No   10. Have you ever had problems with anemia or been told to take iron pills? No   11. Have you had any abnormal blood loss such as black, tarry or bloody stools? No   12. Have you ever had a blood transfusion? No   13. Have you or any of your relatives ever had problems with anesthesia? No   14. Do you have sleep apnea, excessive snoring or daytime  drowsiness? No   15. Do you have any prosthetic heart valves? No   16. Do you have prosthetic joints? No         HPI:     HPI related to upcoming procedure: Tore ACL and damaged mensci playing volleyball on August 13th. Significant pain initially followed by instability. Patient is not any discomfort today and has no concerns.     Patient denies any specific problems, illnesses, or medication history.     MEDICAL HISTORY:     Patient Active Problem List    Diagnosis Date Noted     Dyslipidemia 03/26/2017     Priority: Medium     Essential hypertension with goal blood pressure less than 140/90 06/21/2016     Priority: Medium     Elevated liver enzymes 01/28/2015     Priority: Medium     HDL deficiency 04/24/2012     Priority: Medium     Hypertriglyceridemia 04/24/2012     Priority: Medium     Hyperlipidemia LDL goal <130 12/16/2011     Priority: Medium     Obesity 12/16/2011     Priority: Medium      History reviewed. No pertinent past medical history.  Past Surgical History:   Procedure Laterality Date     APPENDECTOMY  around 2001     Current Outpatient Medications   Medication Sig Dispense Refill     Ascorbic Acid (VITAMIN C) 500 MG CAPS        FISH OIL 3 g daily.       lisinopril-hydrochlorothiazide (PRINZIDE/ZESTORETIC) 10-12.5 MG per tablet TAKE ONE TABLET BY MOUTH DAILY 10 tablet 0     Multiple Vitamin (MULTI VITAMIN MENS PO) Take  by mouth.       OTC products: Multivitamin    No Known Allergies   Latex Allergy: NO    Social History     Tobacco Use     Smoking status: Never Smoker     Smokeless tobacco: Never Used   Substance Use Topics     Alcohol use: Yes     Comment: rarely     History   Drug Use No       REVIEW OF SYSTEMS:   CONSTITUTIONAL: NEGATIVE for fever, chills, change in weight  INTEGUMENTARY/SKIN: NEGATIVE for worrisome rashes, moles or lesions  EYES: NEGATIVE for vision changes or irritation  ENT/MOUTH: NEGATIVE for ear, mouth and throat problems  RESP: NEGATIVE for significant cough or SOB  CV:  "NEGATIVE for chest pain, palpitations or peripheral edema  GI: NEGATIVE for nausea, abdominal pain, heartburn, or change in bowel habits  : NEGATIVE for frequency, dysuria, or hematuria  MUSCULOSKELETAL: POSITIVE pain in knee  NEURO: NEGATIVE for weakness, dizziness or paresthesias  ENDOCRINE: NEGATIVE for temperature intolerance, skin/hair changes  HEME: NEGATIVE for bleeding problems  PSYCHIATRIC: NEGATIVE for changes in mood or affect    EXAM:     /83   Pulse 83   Temp 98.2  F (36.8  C) (Oral)   Resp 18   Ht 1.778 m (5' 10\")   Wt 97.1 kg (214 lb)   SpO2 96%   BMI 30.71 kg/m         GENERAL APPEARANCE: healthy, alert and no distress     EYES: EOMI,  PERRL     HENT: ear canals and TM's normal and nose and mouth without ulcers or lesions     NECK: no adenopathy, no asymmetry, masses, or scars and thyroid normal to palpation     RESP: lungs clear to auscultation - no rales, rhonchi or wheezes     CV: regular rates and rhythm, normal S1 S2, no S3 or S4 and no murmur, click or rub     ABDOMEN:  soft, nontender, no HSM or masses and bowel sounds normal     MS: extremities normal- no gross deformities noted, no evidence of inflammation in joints, FROM in all extremities.     SKIN: no suspicious lesions or rashes     NEURO: Normal strength and tone, sensory exam grossly normal, mentation intact and speech normal     PSYCH: mentation appears normal. and affect normal/bright     LYMPHATICS: No cervical adenopathy    DIAGNOSTICS:   EKG: Not indicated due to non-vascular surgery and low risk of event (age <65 and without cardiac risk factors)    Recent Labs   Lab Test 06/15/18  0837 12/15/15  0730 02/04/15  0742   HGB  --   --  15.1   PLT  --   --  273    144 141   POTASSIUM 3.8 3.9 4.1   CR 1.04 1.26* 1.21        IMPRESSION:   Reason for surgery/procedure: left knee pain .    The proposed surgical procedure is considered LOW risk.    REVISED CARDIAC RISK INDEX  The patient has the following serious " cardiovascular risks for perioperative complications such as (MI, PE, VFib and 3  AV Block):  No serious cardiac risks  INTERPRETATION: 0 risks: Class I (very low risk - 0.4% complication rate)    The patient has the following additional risks for perioperative complications:  No identified additional risks      ICD-10-CM    1. Preop general physical exam Z01.818 Hemoglobin     Basic metabolic panel   2. Left knee pain, unspecified chronicity M25.562        RECOMMENDATIONS:     No important conditions of concern. No recommendations.    Patient to discontinue vitamins and ibuprofen 7 days before surgery. Continue to take lisinopril and HTCZ as normal.     Fax # for surgery center: [phone] - Appropriate information will be sent.     APPROVAL GIVEN to proceed with proposed procedure, without further diagnostic evaluation. Pending lab work.      - HEIDE Chen  The student acted as a scribe and the encounter documented above was completely performed by myself and the documentation reflects the work I have performed today.     Signed Electronically by: Aftab Mas PA-C  Chart reviewed, agree with evaluation and recommendations above.  Nel Becerra M.D.       Copy of this evaluation report is provided to requesting physician.    [place] Preop Guidelines    Revised Cardiac Risk Index

## 2019-09-13 ENCOUNTER — OFFICE VISIT (OUTPATIENT)
Dept: FAMILY MEDICINE | Facility: CLINIC | Age: 44
End: 2019-09-13
Payer: COMMERCIAL

## 2019-09-13 VITALS
DIASTOLIC BLOOD PRESSURE: 83 MMHG | TEMPERATURE: 98.2 F | WEIGHT: 214 LBS | HEART RATE: 83 BPM | SYSTOLIC BLOOD PRESSURE: 136 MMHG | OXYGEN SATURATION: 96 % | HEIGHT: 70 IN | BODY MASS INDEX: 30.64 KG/M2 | RESPIRATION RATE: 18 BRPM

## 2019-09-13 DIAGNOSIS — Z01.818 PREOP GENERAL PHYSICAL EXAM: Primary | ICD-10-CM

## 2019-09-13 DIAGNOSIS — M25.562 LEFT KNEE PAIN, UNSPECIFIED CHRONICITY: ICD-10-CM

## 2019-09-13 LAB
ANION GAP SERPL CALCULATED.3IONS-SCNC: 3 MMOL/L (ref 3–14)
BUN SERPL-MCNC: 12 MG/DL (ref 7–30)
CALCIUM SERPL-MCNC: 9.2 MG/DL (ref 8.5–10.1)
CHLORIDE SERPL-SCNC: 107 MMOL/L (ref 94–109)
CO2 SERPL-SCNC: 31 MMOL/L (ref 20–32)
CREAT SERPL-MCNC: 1.06 MG/DL (ref 0.66–1.25)
GFR SERPL CREATININE-BSD FRML MDRD: 84 ML/MIN/{1.73_M2}
GLUCOSE SERPL-MCNC: 91 MG/DL (ref 70–99)
HGB BLD-MCNC: 15.5 G/DL (ref 13.3–17.7)
POTASSIUM SERPL-SCNC: 4.1 MMOL/L (ref 3.4–5.3)
SODIUM SERPL-SCNC: 141 MMOL/L (ref 133–144)

## 2019-09-13 PROCEDURE — 99214 OFFICE O/P EST MOD 30 MIN: CPT | Performed by: PHYSICIAN ASSISTANT

## 2019-09-13 PROCEDURE — 85018 HEMOGLOBIN: CPT | Performed by: PHYSICIAN ASSISTANT

## 2019-09-13 PROCEDURE — 36415 COLL VENOUS BLD VENIPUNCTURE: CPT | Performed by: PHYSICIAN ASSISTANT

## 2019-09-13 PROCEDURE — 80048 BASIC METABOLIC PNL TOTAL CA: CPT | Performed by: PHYSICIAN ASSISTANT

## 2019-09-13 ASSESSMENT — MIFFLIN-ST. JEOR: SCORE: 1861.95

## 2019-09-16 NOTE — PROGRESS NOTES
I faxed the Pre Op and labs to [facility] [place]/Dr. Bejarano @[phone].  Jessika Moran,

## 2019-09-17 NOTE — RESULT ENCOUNTER NOTE
MrFelton Hernandez,    All of your labs were normal for you.    Please contact the clinic if you have additional questions.  Thank you.    Sincerely,    Aftab Mas PA-C

## 2019-09-25 DIAGNOSIS — I10 ESSENTIAL HYPERTENSION WITH GOAL BLOOD PRESSURE LESS THAN 140/90: ICD-10-CM

## 2019-09-25 RX ORDER — LISINOPRIL/HYDROCHLOROTHIAZIDE 10-12.5 MG
TABLET ORAL
Qty: 90 TABLET | Refills: 0 | Status: SHIPPED | OUTPATIENT
Start: 2019-09-25 | End: 2020-01-15

## 2019-09-25 NOTE — TELEPHONE ENCOUNTER
Patient saw Aftab Mas PA-C for Pre op on 9/13/19, medication d/c'd.  Plan is unclear.  Please advise on refill.  Kadi Kinsey RN

## 2019-09-27 ENCOUNTER — HEALTH MAINTENANCE LETTER (OUTPATIENT)
Age: 44
End: 2019-09-27

## 2019-09-30 ENCOUNTER — TRANSFERRED RECORDS (OUTPATIENT)
Dept: HEALTH INFORMATION MANAGEMENT | Facility: CLINIC | Age: 44
End: 2019-09-30

## 2019-10-28 ENCOUNTER — TRANSFERRED RECORDS (OUTPATIENT)
Dept: HEALTH INFORMATION MANAGEMENT | Facility: CLINIC | Age: 44
End: 2019-10-28

## 2019-12-19 ENCOUNTER — TRANSFERRED RECORDS (OUTPATIENT)
Dept: HEALTH INFORMATION MANAGEMENT | Facility: CLINIC | Age: 44
End: 2019-12-19

## 2020-01-14 DIAGNOSIS — I10 ESSENTIAL HYPERTENSION WITH GOAL BLOOD PRESSURE LESS THAN 140/90: ICD-10-CM

## 2020-01-15 RX ORDER — LISINOPRIL/HYDROCHLOROTHIAZIDE 10-12.5 MG
TABLET ORAL
Qty: 90 TABLET | Refills: 0 | Status: SHIPPED | OUTPATIENT
Start: 2020-01-15 | End: 2022-06-25

## 2020-01-15 NOTE — TELEPHONE ENCOUNTER
"Requested Prescriptions   Signed Prescriptions Disp Refills    lisinopril-hydrochlorothiazide (PRINZIDE/ZESTORETIC) 10-12.5 MG tablet 90 tablet 0     Sig: TAKE 1 TABLET BY MOUTH EVERY DAY       Diuretics (Including Combos) Protocol Passed - 1/14/2020 10:59 AM        Passed - Blood pressure under 140/90 in past 12 months     BP Readings from Last 3 Encounters:   09/13/19 136/83   08/29/19 (!) 150/91   08/17/19 120/78                 Passed - Recent (12 mo) or future (30 days) visit within the authorizing provider's specialty     Patient has had an office visit with the authorizing provider or a provider within the authorizing providers department within the previous 12 mos or has a future within next 30 days. See \"Patient Info\" tab in inbasket, or \"Choose Columns\" in Meds & Orders section of the refill encounter.              Passed - Medication is active on med list        Passed - Patient is age 18 or older        Passed - Normal serum creatinine on file in past 12 months     Recent Labs   Lab Test 09/13/19  1227   CR 1.06              Passed - Normal serum potassium on file in past 12 months     Recent Labs   Lab Test 09/13/19  1227   POTASSIUM 4.1                    Passed - Normal serum sodium on file in past 12 months     Recent Labs   Lab Test 09/13/19  1227                   "

## 2020-02-12 ENCOUNTER — TELEPHONE (OUTPATIENT)
Dept: FAMILY MEDICINE | Facility: CLINIC | Age: 45
End: 2020-02-12

## 2020-02-12 NOTE — TELEPHONE ENCOUNTER
Reason for Call:  Other call back    Detailed comments: Spouse is calling stating patient traveling to [place] and would like to know dates of immunizations such as tetanus. Please call to advise. Thank  You  Caller informed that calls received after 3pm may not be returned same day.  Thank you.    Phone Number Patient can be reached at: Cell number on file:    Telephone Information:   Mobile [phone]       Best Time:     Can we leave a detailed message on this number? YES    Call taken on 2/12/2020 at 3:33 PM by Leonarda Pandya

## 2020-03-09 ENCOUNTER — TRANSFERRED RECORDS (OUTPATIENT)
Dept: HEALTH INFORMATION MANAGEMENT | Facility: CLINIC | Age: 45
End: 2020-03-09

## 2021-01-09 ENCOUNTER — HEALTH MAINTENANCE LETTER (OUTPATIENT)
Age: 46
End: 2021-01-09

## 2021-10-23 ENCOUNTER — HEALTH MAINTENANCE LETTER (OUTPATIENT)
Age: 46
End: 2021-10-23

## 2022-02-12 ENCOUNTER — HEALTH MAINTENANCE LETTER (OUTPATIENT)
Age: 47
End: 2022-02-12

## 2022-06-25 ENCOUNTER — NURSE TRIAGE (OUTPATIENT)
Dept: NURSING | Facility: CLINIC | Age: 47
End: 2022-06-25

## 2022-06-25 ENCOUNTER — OFFICE VISIT (OUTPATIENT)
Dept: URGENT CARE | Facility: URGENT CARE | Age: 47
End: 2022-06-25
Payer: COMMERCIAL

## 2022-06-25 VITALS
TEMPERATURE: 98.7 F | OXYGEN SATURATION: 97 % | SYSTOLIC BLOOD PRESSURE: 155 MMHG | WEIGHT: 233.6 LBS | HEIGHT: 70 IN | RESPIRATION RATE: 12 BRPM | HEART RATE: 88 BPM | BODY MASS INDEX: 33.44 KG/M2 | DIASTOLIC BLOOD PRESSURE: 92 MMHG

## 2022-06-25 DIAGNOSIS — I10 ESSENTIAL HYPERTENSION WITH GOAL BLOOD PRESSURE LESS THAN 140/90: Primary | ICD-10-CM

## 2022-06-25 DIAGNOSIS — E66.09 OBESITY DUE TO EXCESS CALORIES WITHOUT SERIOUS COMORBIDITY, UNSPECIFIED CLASSIFICATION: ICD-10-CM

## 2022-06-25 PROCEDURE — 99214 OFFICE O/P EST MOD 30 MIN: CPT | Performed by: FAMILY MEDICINE

## 2022-06-25 RX ORDER — AMLODIPINE BESYLATE 5 MG/1
5 TABLET ORAL AT BEDTIME
Qty: 30 TABLET | Refills: 0 | Status: SHIPPED | OUTPATIENT
Start: 2022-06-25 | End: 2022-07-26

## 2022-06-25 NOTE — TELEPHONE ENCOUNTER
In the last few days patient isnt feeling well.    Took blood pressure   170/115  139/99    Patient denies any weakness/numbness on one side of body, no vision issues, does have a headache but not severe, no chest pain, no breathing issues.    Explained to patient we would recommend him to be seen in clinic in the next 2 weeks.  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  He would like his blood pressure evaluated before going.  Plan is for patient to go to Urgent Care to have the blood pressure evaluated there now.    Bety Torres RN   06/25/22 6:21 PM  [facility] Nurse Advisor    Reason for Disposition    [1] Systolic BP  >= 130 OR Diastolic >= 80 AND [2] not taking BP medications    Additional Information    Negative: Difficult to awaken or acting confused (e.g., disoriented, slurred speech)    Negative: Severe difficulty breathing (e.g., struggling for each breath, speaks in single words)    Negative: [1] Weakness of the face, arm or leg on one side of the body AND [2] new onset    Negative: [1] Numbness (i.e., loss of sensation) of the face, arm or leg on one side of the body AND [2] new onset    Negative: [1] Chest pain lasts > 5 minutes AND [2] history of heart disease  (i.e., heart attack, bypass surgery, angina, angioplasty, CHF)    Negative: [1] Chest pain AND [2] took nitrogylcerin AND [3] pain was not relieved    Negative: Sounds like a life-threatening emergency to the triager    Negative: Symptom is main concern  (e.g., headache, chest pain)    Negative: Low blood pressure is main concern    Negative: [1] Systolic BP  >= 160 OR Diastolic >= 100 AND [2] cardiac or neurologic symptoms (e.g., chest pain, difficulty breathing, unsteady gait, blurred vision)    Negative: [1] Pregnant > 20 weeks (or postpartum < 6 weeks) AND [2] new hand or face swelling    Negative: [1] Pregnant > 20 weeks AND [2] BP Systolic BP  >= 140 OR Diastolic >= 90    Negative: [1]  Systolic BP  >= 200 OR Diastolic >= 120  AND [2] having NO cardiac or neurologic symptoms    Negative: [1] Postpartum < 6 weeks AND [2] BP Systolic BP  >= 140 OR Diastolic >= 90    Negative: [1] Systolic BP  >= 180 OR Diastolic >= 110 AND [2] missed most recent dose of blood pressure medication    Negative: Systolic BP  >= 180 OR Diastolic >= 110    Negative: Ran out of BP medications    Negative: Systolic BP  >= 160 OR Diastolic >= 100    Negative: [1] Taking BP medications AND [2] feels is having side effects (e.g., impotence, cough, dizzy upon standing)    Negative: [1] Systolic BP  >= 130 OR Diastolic >= 80 AND [2] pregnant    Negative: [1] Systolic BP  >= 130 OR Diastolic >= 80 AND [2] taking BP medications    Protocols used: HIGH BLOOD PRESSURE-A-AH

## 2022-06-26 NOTE — PROGRESS NOTES
"  Assessment & Plan     Essential hypertension with goal blood pressure less than 140/90  No chest pain, no shortness of breath.  Has not taking his blood pressure medication.  Advised patient with diet modification, increase physical activity, reduce portion size, decrease salt intake.  Was started on amlodipine 1 tablet daily, check blood pressure daily at home.    Follow-up with primary care physician in 1 week time.  - amLODIPine (NORVASC) 5 MG tablet; Take 1 tablet (5 mg) by mouth At Bedtime    Obesity due to excess calories without serious comorbidity, unspecified classification  Discussed diet medication, increase physical activity, reduce portion size.  Avoid salty food, avoid late meals       BMI:   Estimated body mass index is 33.52 kg/m  as calculated from the following:    Height as of this encounter: 1.778 m (5' 10\").    Weight as of this encounter: 106 kg (233 lb 9.6 oz).   Weight management plan: Discussed healthy diet and exercise guidelines    Work on weight loss  Regular exercise    Return in about 1 week (around 7/2/2022) for Follow up.    Zo Dunn MD  [facility]   Jose is a 46 year oldpresenting for the following health issues:  Hypertension (Started not feeling well a couple days ago- his BP was 173/120. Went down to 140/99/Headache and fatigue today- 170/115 )  Patient reports blood pressure has been high for the past week or so.  Previously was on blood pressure medication, he was able to stop the medication after he lost weight.  Patient had gained weight in the past 2 years,  He denies any chest pain, denies short of breath, denies change in his vision, denies being lightheaded or dizzy.  No lower extremity edema.  Some time he has headache, otherwise, no change in his vision.  Not a smoker.    HPI       Review of Systems   Constitutional, HEENT, cardiovascular, pulmonary, GI, , musculoskeletal, neuro, skin, endocrine and psych systems are " "negative, except as otherwise noted.      Objective    BP (!) 155/92 (BP Location: Left arm, Patient Position: Sitting)   Pulse 88   Temp 98.7  F (37.1  C) (Tympanic)   Resp 12   Ht 1.778 m (5' 10\")   Wt 106 kg (233 lb 9.6 oz)   SpO2 97%   BMI 33.52 kg/m    Body mass index is 33.52 kg/m .  Physical Exam   GENERAL: healthy, alert and no distress  EYES: Eyes grossly normal to inspection, PERRL and conjunctivae and sclerae normal  HENT: ear canals and TM's normal, nose and mouth without ulcers or lesions  NECK: no adenopathy, no asymmetry, masses, or scars and thyroid normal to palpation  RESP: lungs clear to auscultation - no rales, rhonchi or wheezes  CV: regular rate and rhythm, normal S1 S2, no S3 or S4, no murmur, click or rub, no peripheral edema and peripheral pulses strong  ABDOMEN: soft, nontender, no hepatosplenomegaly, no masses and bowel sounds normal  MS: no gross musculoskeletal defects noted, no edema  SKIN: no suspicious lesions or rashes  NEURO: Normal strength and tone, mentation intact and speech normal  PSYCH: mentation appears normal, affect normal/bright    No orders of the defined types were placed in this encounter.        Zo Dunn MD            .  ..  "

## 2022-07-22 DIAGNOSIS — I10 ESSENTIAL HYPERTENSION WITH GOAL BLOOD PRESSURE LESS THAN 140/90: ICD-10-CM

## 2022-07-22 NOTE — TELEPHONE ENCOUNTER
Routing to [facility] refill    Patient saw Dr. Shaun rand 1 in Urgent Care    Carson Estrada, RN, BSN, PHN  [facility]

## 2022-07-25 NOTE — TELEPHONE ENCOUNTER
Patient calling asking what the hold up for amlodipine.    Informed that he received a [facility]ve[facility] refill and didn't schedule anything.  It has been a long time since was seen.    Advised to at least to schedule a visit first before sending a request to the provider for the bridge.    Patient transferred to scheduling.    lEi Wells RN

## 2022-07-26 RX ORDER — AMLODIPINE BESYLATE 5 MG/1
5 TABLET ORAL AT BEDTIME
Qty: 30 TABLET | Refills: 0 | Status: SHIPPED | OUTPATIENT
Start: 2022-07-26 | End: 2022-08-11

## 2022-08-11 ENCOUNTER — OFFICE VISIT (OUTPATIENT)
Dept: FAMILY MEDICINE | Facility: CLINIC | Age: 47
End: 2022-08-11
Payer: COMMERCIAL

## 2022-08-11 VITALS
WEIGHT: 228 LBS | BODY MASS INDEX: 32.64 KG/M2 | OXYGEN SATURATION: 98 % | HEART RATE: 84 BPM | SYSTOLIC BLOOD PRESSURE: 120 MMHG | DIASTOLIC BLOOD PRESSURE: 85 MMHG | TEMPERATURE: 96.8 F | HEIGHT: 70 IN

## 2022-08-11 DIAGNOSIS — B35.1 ONYCHOMYCOSIS: ICD-10-CM

## 2022-08-11 DIAGNOSIS — Z00.00 ROUTINE GENERAL MEDICAL EXAMINATION AT A HEALTH CARE FACILITY: Primary | ICD-10-CM

## 2022-08-11 DIAGNOSIS — Z12.11 COLON CANCER SCREENING: ICD-10-CM

## 2022-08-11 DIAGNOSIS — I10 ESSENTIAL HYPERTENSION WITH GOAL BLOOD PRESSURE LESS THAN 140/90: ICD-10-CM

## 2022-08-11 DIAGNOSIS — E78.1 HYPERTRIGLYCERIDEMIA: ICD-10-CM

## 2022-08-11 LAB
ALBUMIN SERPL-MCNC: 4.2 G/DL (ref 3.4–5)
ALP SERPL-CCNC: 72 U/L (ref 40–150)
ALT SERPL W P-5'-P-CCNC: 51 U/L (ref 0–70)
ANION GAP SERPL CALCULATED.3IONS-SCNC: 4 MMOL/L (ref 3–14)
AST SERPL W P-5'-P-CCNC: 62 U/L (ref 0–45)
BILIRUB SERPL-MCNC: 0.6 MG/DL (ref 0.2–1.3)
BUN SERPL-MCNC: 12 MG/DL (ref 7–30)
CALCIUM SERPL-MCNC: 8.7 MG/DL (ref 8.5–10.1)
CHLORIDE BLD-SCNC: 105 MMOL/L (ref 94–109)
CHOLEST SERPL-MCNC: 250 MG/DL
CO2 SERPL-SCNC: 29 MMOL/L (ref 20–32)
CREAT SERPL-MCNC: 1.02 MG/DL (ref 0.66–1.25)
FASTING STATUS PATIENT QL REPORTED: YES
GFR SERPL CREATININE-BSD FRML MDRD: >90 ML/MIN/1.73M2
GLUCOSE BLD-MCNC: 108 MG/DL (ref 70–99)
HBA1C MFR BLD: 5.3 % (ref 0–5.6)
HDLC SERPL-MCNC: 34 MG/DL
LDLC SERPL CALC-MCNC: 92 MG/DL
LDLC SERPL CALC-MCNC: ABNORMAL MG/DL
NONHDLC SERPL-MCNC: 216 MG/DL
POTASSIUM BLD-SCNC: 3.6 MMOL/L (ref 3.4–5.3)
PROT SERPL-MCNC: 7.4 G/DL (ref 6.8–8.8)
SODIUM SERPL-SCNC: 138 MMOL/L (ref 133–144)
TRIGL SERPL-MCNC: 771 MG/DL

## 2022-08-11 PROCEDURE — 83721 ASSAY OF BLOOD LIPOPROTEIN: CPT | Mod: 59 | Performed by: FAMILY MEDICINE

## 2022-08-11 PROCEDURE — 90471 IMMUNIZATION ADMIN: CPT | Performed by: FAMILY MEDICINE

## 2022-08-11 PROCEDURE — 80061 LIPID PANEL: CPT | Performed by: FAMILY MEDICINE

## 2022-08-11 PROCEDURE — 80053 COMPREHEN METABOLIC PANEL: CPT | Performed by: FAMILY MEDICINE

## 2022-08-11 PROCEDURE — 36415 COLL VENOUS BLD VENIPUNCTURE: CPT | Performed by: FAMILY MEDICINE

## 2022-08-11 PROCEDURE — 90715 TDAP VACCINE 7 YRS/> IM: CPT | Performed by: FAMILY MEDICINE

## 2022-08-11 PROCEDURE — 83036 HEMOGLOBIN GLYCOSYLATED A1C: CPT | Performed by: FAMILY MEDICINE

## 2022-08-11 PROCEDURE — 99396 PREV VISIT EST AGE 40-64: CPT | Mod: 25 | Performed by: FAMILY MEDICINE

## 2022-08-11 PROCEDURE — 99213 OFFICE O/P EST LOW 20 MIN: CPT | Mod: 25 | Performed by: FAMILY MEDICINE

## 2022-08-11 RX ORDER — CICLOPIROX 80 MG/ML
SOLUTION TOPICAL
Qty: 6.6 ML | Refills: 11 | Status: SHIPPED | OUTPATIENT
Start: 2022-08-11

## 2022-08-11 RX ORDER — FENOFIBRATE 67 MG/1
67 CAPSULE ORAL
Qty: 90 CAPSULE | Refills: 1 | Status: SHIPPED | OUTPATIENT
Start: 2022-08-11 | End: 2023-01-05

## 2022-08-11 RX ORDER — AMLODIPINE BESYLATE 5 MG/1
5 TABLET ORAL AT BEDTIME
Qty: 90 TABLET | Refills: 1 | Status: SHIPPED | OUTPATIENT
Start: 2022-08-11 | End: 2023-01-05

## 2022-08-11 ASSESSMENT — ENCOUNTER SYMPTOMS
DYSURIA: 0
JOINT SWELLING: 0
MYALGIAS: 0
DIARRHEA: 0
NAUSEA: 0
EYE PAIN: 0
ABDOMINAL PAIN: 0
FREQUENCY: 0
HEMATOCHEZIA: 0
ARTHRALGIAS: 0
DIZZINESS: 0
CONSTIPATION: 0
FEVER: 0
CHILLS: 0
COUGH: 0
HEARTBURN: 0
HEMATURIA: 0
WEAKNESS: 0
HEADACHES: 0
NERVOUS/ANXIOUS: 0
SHORTNESS OF BREATH: 0
PARESTHESIAS: 0
PALPITATIONS: 0
SORE THROAT: 0

## 2022-08-11 ASSESSMENT — PAIN SCALES - GENERAL: PAINLEVEL: NO PAIN (0)

## 2022-08-11 NOTE — NURSING NOTE
Prior to immunization administration, verified patients identity using patient s name and date of birth. Please see Immunization Activity for additional information.     Screening Questionnaire for Adult Immunization    Are you sick today?   No   Do you have allergies to medications, food, a vaccine component or latex?   No   Have you ever had a serious reaction after receiving a vaccination?   No   Do you have a long-term health problem with heart, lung, kidney, or metabolic disease (e.g., diabetes), asthma, a blood disorder, no spleen, complement component deficiency, a cochlear implant, or a spinal fluid leak?  Are you on long-term aspirin therapy?   No   Do you have cancer, leukemia, HIV/AIDS, or any other immune system problem?   No   Do you have a parent, brother, or sister with an immune system problem?   No   In the past 3 months, have you taken medications that affect  your immune system, such as prednisone, other steroids, or anticancer drugs; drugs for the treatment of rheumatoid arthritis, Crohn s disease, or psoriasis; or have you had radiation treatments?   No   Have you had a seizure, or a brain or other nervous system problem?   No   During the past year, have you received a transfusion of blood or blood    products, or been given immune (gamma) globulin or antiviral drug?   No   For women: Are you pregnant or is there a chance you could become       pregnant during the next month?   No   Have you received any vaccinations in the past 4 weeks?   No     Immunization questionnaire answers were all negative.        Per orders of Dr. Jefferson, injection of Tdap given by Lynnette Best CMA. Patient instructed to remain in clinic for 15 minutes afterwards, and to report any adverse reaction to me immediately.       Screening performed by Lynnette Best CMA on 8/11/2022 at 7:54 AM.

## 2022-08-11 NOTE — PROGRESS NOTES
SUBJECTIVE:   CC: Jose Hernandez is an 47 year old male who presents for preventative health visit.       Patient has been advised of split billing requirements and indicates understanding: Yes  Healthy Habits:     Getting at least 3 servings of Calcium per day:  Yes    Bi-annual eye exam:  NO    Dental care twice a year:  Yes    Sleep apnea or symptoms of sleep apnea:  None    Diet:  Low salt    Frequency of exercise:  2-3 days/week    Duration of exercise:  15-30 minutes    Taking medications regularly:  Yes    Medication side effects:  None    PHQ-2 Total Score: 0    Additional concerns today:  Yes      Preventive -     Immunization History   Administered Date(s) Administered     Influenza (IIV3) PF 10/21/2008, 01/12/2015     TDAP Vaccine (Adacel) 12/16/2011     Td (Adult), Adsorbed 01/01/1999       -STD screen: declines      - Colon CA screen: Colonoscopy, age 50-75 every 10 years or FIT every year or Cologuard every 3 years     Referred       -lipids screen: ordered     Diabetes screen: ordered         Today's PHQ-2 Score:   PHQ-2 ( 1999 Pfizer) 8/11/2022   Q1: Little interest or pleasure in doing things 0   Q2: Feeling down, depressed or hopeless 0   PHQ-2 Score 0   PHQ-2 Total Score (12-17 Years)- Positive if 3 or more points; Administer PHQ-A if positive -   Q1: Little interest or pleasure in doing things Not at all   Q2: Feeling down, depressed or hopeless Not at all   PHQ-2 Score 0       Abuse: Current or Past(Physical, Sexual or Emotional)- No  Do you feel safe in your environment? Yes    Have you ever done Advance Care Planning? (For example, a Health Directive, POLST, or a discussion with a medical provider or your loved ones about your wishes): No, advance care planning information given to patient to review.  Patient plans to discuss their wishes with loved ones or provider.      Social History     Tobacco Use     Smoking status: Never Smoker     Smokeless tobacco: Never Used   Substance Use Topics      Alcohol use: Yes     Comment: rarely     If you drink alcohol do you typically have >3 drinks per day or >7 drinks per week? No    Alcohol Use 8/11/2022   Prescreen: >3 drinks/day or >7 drinks/week? No   Prescreen: >3 drinks/day or >7 drinks/week? -   No flowsheet data found.    Last PSA: No results found for: PSA    Reviewed orders with patient. Reviewed health maintenance and updated orders accordingly - Yes  Lab work is in process  BP Readings from Last 3 Encounters:   08/11/22 120/85   06/25/22 (!) 155/92   09/13/19 136/83    Wt Readings from Last 3 Encounters:   08/11/22 103.4 kg (228 lb)   06/25/22 106 kg (233 lb 9.6 oz)   09/13/19 97.1 kg (214 lb)                  Patient Active Problem List   Diagnosis     Hyperlipidemia LDL goal <130     Obesity     HDL deficiency     Hypertriglyceridemia     Elevated liver enzymes     Essential hypertension with goal blood pressure less than 140/90     Dyslipidemia     Past Surgical History:   Procedure Laterality Date     APPENDECTOMY  around 2001       Social History     Tobacco Use     Smoking status: Never Smoker     Smokeless tobacco: Never Used   Substance Use Topics     Alcohol use: Yes     Comment: rarely     Family History   Problem Relation Age of Onset     Lipids Mother      Hypertension Father      Diabetes Father      Lipids Father          Current Outpatient Medications   Medication Sig Dispense Refill     amLODIPine (NORVASC) 5 MG tablet Take 1 tablet (5 mg) by mouth At Bedtime 90 tablet 1     ciclopirox (PENLAC) 8 % external solution Apply to adjacent skin and affected nails daily.  Remove with alcohol every 7 days, then repeat. 6.6 mL 11     Ascorbic Acid (VITAMIN C) 500 MG CAPS  (Patient not taking: Reported on 8/11/2022)       FISH OIL 3 g daily. (Patient not taking: No sig reported)       Multiple Vitamin (MULTI VITAMIN MENS PO) Take  by mouth. (Patient not taking: No sig reported)       No Known Allergies  Recent Labs   Lab Test 09/13/19  1227  "06/15/18  0837 12/15/15  0730 02/04/15  0742   LDL  --  111* 63 90   HDL  --  41 44 37*   TRIG  --  279* 114 269*   ALT  --   --  21 33   CR 1.06 1.04 1.26* 1.21   GFRESTIMATED 84 78 63 66   GFRESTBLACK >90 >90 76 80   POTASSIUM 4.1 3.8 3.9 4.1   TSH  --   --   --  1.15        Reviewed and updated as needed this visit by clinical staff   Tobacco  Allergies  Meds   Med Hx  Surg Hx  Fam Hx  Soc Hx          Reviewed and updated as needed this visit by Provider                   Past Medical History:   Diagnosis Date     Benign essential hypertension       Past Surgical History:   Procedure Laterality Date     APPENDECTOMY  around 2001       Review of Systems   Constitutional: Negative for chills and fever.   HENT: Negative for congestion, ear pain, hearing loss and sore throat.    Eyes: Negative for pain and visual disturbance.   Respiratory: Negative for cough and shortness of breath.    Cardiovascular: Negative for chest pain, palpitations and peripheral edema.   Gastrointestinal: Negative for abdominal pain, constipation, diarrhea, heartburn, hematochezia and nausea.   Genitourinary: Negative for dysuria, frequency, genital sores, hematuria and urgency.   Musculoskeletal: Negative for arthralgias, joint swelling and myalgias.   Skin: Negative for rash.   Neurological: Negative for dizziness, weakness, headaches and paresthesias.   Psychiatric/Behavioral: Negative for mood changes. The patient is not nervous/anxious.          OBJECTIVE:   BP (!) 150/96 (BP Location: Left arm, Patient Position: Sitting, Cuff Size: Adult Large)   Pulse 84   Temp 96.8  F (36  C) (Tympanic)   Ht 1.778 m (5' 10\")   Wt 103.4 kg (228 lb)   SpO2 98%   BMI 32.71 kg/m      Physical Exam  GENERAL: healthy, alert and no distress  EYES: Eyes grossly normal to inspection, PERRL and conjunctivae and sclerae normal  HENT: ear canals and TM's normal, nose and mouth without ulcers or lesions  NECK: no adenopathy, no asymmetry, masses, or " scars and thyroid normal to palpation  RESP: lungs clear to auscultation - no rales, rhonchi or wheezes  CV: regular rate and rhythm, normal S1 S2, no S3 or S4, no murmur, click or rub, no peripheral edema and peripheral pulses strong  ABDOMEN: soft, nontender, no hepatosplenomegaly, no masses and bowel sounds normal  MS: no gross musculoskeletal defects noted, no edema  SKIN: no suspicious lesions or rashes  NEURO: Normal strength and tone, mentation intact and speech normal  PSYCH: mentation appears normal, affect normal/bright  Right index and middle nail fungal disease       ASSESSMENT/PLAN:   (Z00.00) Routine general medical examination at a health care facility  (primary encounter diagnosis)  Comment: Preventive care reviewed and updated.     -We discussed recommendation of 150 min moderate intensity exercise /week   - We discussed the need for heart healthy diet including a diet rich in fruits, vegetables and fiber and very low on carbonated beverages sugar  - We discussed recommendation of moderation of alcoho    Plan: Lipid panel reflex to direct LDL Fasting,         Comprehensive metabolic panel (BMP + Alb, Alk         Phos, ALT, AST, Total. Bili, TP), Hemoglobin         A1c      (I10) Essential hypertension with goal blood pressure less than 140/90  Comment: hx of hypertension , he ws on lisinopril - hydrochlorothiazide previously , stopped after losing weight. Recently seen in the urgent care for hypertension started on Norvasc 5 gm , doing well, BP is at goal today , no side effects    Plan: amLODIPine (NORVASC) 5 MG tablet        continue Norvasc 5 mg - refill provided   (Z12.11) Colon cancer screening    Plan: Colonscopy Screening  Referral      (B35.1) Onychomycosis  Comment: present on right index and middle finger nail   Plan: ciclopirox (PENLAC) 8 % external solution             Patient has been advised of split billing requirements and indicates understanding: Yes    COUNSELING:  "  Reviewed preventive health counseling, as reflected in patient instructions       Regular exercise       Healthy diet/nutrition    Estimated body mass index is 32.71 kg/m  as calculated from the following:    Height as of this encounter: 1.778 m (5' 10\").    Weight as of this encounter: 103.4 kg (228 lb).     Weight management plan: Discussed healthy diet and exercise guidelines    He reports that he has never smoked. He has never used smokeless tobacco.      Counseling Resources:  ATP IV Guidelines  Pooled Cohorts Equation Calculator  FRAX Risk Assessment  ICSI Preventive Guidelines  Dietary Guidelines for Americans, 2010  USDA's MyPlate  ASA Prophylaxis  Lung CA Screening    Shahid Jefferson MD  [facility]  "

## 2022-08-22 DIAGNOSIS — I10 ESSENTIAL HYPERTENSION WITH GOAL BLOOD PRESSURE LESS THAN 140/90: ICD-10-CM

## 2022-08-23 RX ORDER — AMLODIPINE BESYLATE 5 MG/1
TABLET ORAL
Qty: 30 TABLET | OUTPATIENT
Start: 2022-08-23

## 2022-09-12 ENCOUNTER — TELEPHONE (OUTPATIENT)
Dept: GASTROENTEROLOGY | Facility: CLINIC | Age: 47
End: 2022-09-12

## 2022-09-12 NOTE — TELEPHONE ENCOUNTER
Screening Questions    BlueKIND OF PREP RedLOCATION [review exclusion criteria] GreenSEDATION TYPE      1. Are you active on mychart? Yes    2. What insurance is in the chart? P1     3.   Ordering/Referring Provider: Li    4. BMI   (If greater than 40 review exclusion criteria [PAC APPT IF [MAC] @ [facility])  32.7  [If yes, BMI OVER 40-EXTENDED PREP]      **(Sedation review/consideration needed)**  Do you have a legal guardian or Medical Power of    and/or are you able to give consent for your medical care?     Yes    5. Have you had a positive covid test in the last 90 days?   N - NA    6.  Are you currently on dialysis?   N [ If yes, G-PREP & HOSPITAL setting ONLY]     7.  Do you have chronic kidney disease?  N [ If yes, G-PREP ]    8.   Do you have a diagnosis of diabetes?   N   [ If yes, G-PREP ]    9.  On a regular basis do you go 3-5 days between bowel movements?   N   [ If yes, EXTENDED PREP]    10.  Are you taking any prescription pain medications on a routine schedule?    N - NA [ If yes, EXTENDED PREP] [If yes, MAC]      11.   Do you have any chemical dependencies such as alcohol, street drugs, or methadone?    N [If yes, MAC]    12.   Do you have any history of post-traumatic stress syndrome, severe anxiety or history of psychosis?    N  [If yes, MAC]    13.  [FEMALES] Are you currently pregnant? NA    If yes, how many weeks?       Respiratory/Heart Screening:  [If yes to any of the following HOSPITAL setting only]     14. Do you have Pulmonary Hypertension [Lungs]?   N       15. Do you have UNCONTROLLED asthma?   N     16.  Do you use daily home oxygen?  N      17. Do you have mod to severe Obstructive Sleep Apnea?         (OKAY @  [facility]  [facility]  [facility]  [facility]  [facility] - if pt is not on OXYGEN)  N      18.   Have you had a heart or lung transplant?   N      19.   Have you had a stroke or Transient ischemic attack (TIA - aka  mini stroke ) within 6 months?  (If yes, please review exclusion criteria)  N      20.   In the past 6 months, have you had any heart related issues including cardiomyopathy or heart attack?   N           If yes, did it require cardiac stenting or other implantable device?   N      21.   Do you have any implantable devices in your body (pacemaker, defib, LVAD)? (If yes, please review exclusion criteria)  N   22.  Do you take the medication Phentermine?  NO        23. Do you take nitroglycerin?   N           If yes, how often? NA  (if yes, HOSPITAL setting ONLY)    24.  Are you currently taking any blood thinners?    [If yes, INFORM patient to follw  w/ ORDERING PROVIDER FOR BRIDGING INSTRUCTIONS]     N    25.   Do you transfer independently?                (If NO, please HOSPITAL setting ONLY)  Yes    26.   Preferred LOCAL Pharmacy for Pre Prescription:         [id number] - [place], [place] - [address] AT [facility] & [facility]    Scheduling Details  (Please ask for phone number if not scheduled by patient)      Caller : Jose  Date of Procedure: 10/26/22  Surgeon: Rosalio  Location:         Sedation Type: CS l   Conscious Sedation- Needs  for 6 hours after the procedure  MAC/General-Needs  for 24 hours after procedure    NA :[Pre-op Required] at [facility]    [facility] and OR for MAC sedation   (advise patient they will need a pre-op WITH IN 30 DAYS of procedure date)     Type of Procedure Scheduled:   Lower Endoscopy [Colonoscopy]    Which Colonoscopy Prep was Sent?:   GoLytely due to magnesium citrate recall -     [facility] CF PATIENTS & GRO[facility]'S PATIENTS NEEDS EXTENDED PREP       Informed patient they will need an adult  Yes  Cannot take any type of public or medical transportation alone    Pre-Procedure Covid test to be completed at [facility] or Externally: HOME  **INFORMED OF HOME TESTING & LAB OPTION**        Confirmed Nurse will call to complete assessment Yes    Additional comments:    '

## 2022-09-29 ENCOUNTER — TELEPHONE (OUTPATIENT)
Dept: GASTROENTEROLOGY | Facility: CLINIC | Age: 47
End: 2022-09-29

## 2022-09-29 NOTE — TELEPHONE ENCOUNTER
CANCEL - RESCHEDULE    Ordering Provider: Shahid Jefferson MD  Procedure Cancelled: COLON  Procedure Date Cancelled: 10/26  Surgeon of Procedure Cancelled: TAMIA  Sedation type: MOD   Location of Procedure Cancelled: [facility]      Rescheduled: YES     If rescheduled:    Date: 01/05/2023   Location: [facility]   Sedation Type: MOD   PAC / Pre-op Required  N   PREP TYPE: GPREP   Covid Test [ESSC - PCR IN FACILITY] HOME   Note any change or update to original order/sedation:  N/A   Preferred LOCAL Pharmacy for Pre Prescription  [id number] [address] [address] [address] [facility] [address] [facility]       Reason for cancel (please be detailed, any staff messages or encounters to note? SCHED ERROR? FACILITY/ SEDATION CHANGE? ):   PROVIDER OUT       Details and Prep sent via Mgv    Additional details:

## 2022-10-09 ENCOUNTER — HEALTH MAINTENANCE LETTER (OUTPATIENT)
Age: 47
End: 2022-10-09

## 2022-12-28 RX ORDER — BISACODYL 5 MG
TABLET, DELAYED RELEASE (ENTERIC COATED) ORAL
Qty: 4 TABLET | Refills: 0 | Status: SHIPPED | OUTPATIENT
Start: 2022-12-28

## 2022-12-30 RX ORDER — PROCHLORPERAZINE MALEATE 10 MG
10 TABLET ORAL EVERY 6 HOURS PRN
Status: CANCELLED | OUTPATIENT
Start: 2022-12-30

## 2022-12-30 RX ORDER — FLUMAZENIL 0.1 MG/ML
0.2 INJECTION, SOLUTION INTRAVENOUS
Status: CANCELLED | OUTPATIENT
Start: 2022-12-30 | End: 2022-12-31

## 2022-12-30 RX ORDER — ONDANSETRON 2 MG/ML
4 INJECTION INTRAMUSCULAR; INTRAVENOUS EVERY 6 HOURS PRN
Status: CANCELLED | OUTPATIENT
Start: 2022-12-30

## 2022-12-30 RX ORDER — NALOXONE HYDROCHLORIDE 0.4 MG/ML
0.4 INJECTION, SOLUTION INTRAMUSCULAR; INTRAVENOUS; SUBCUTANEOUS
Status: CANCELLED | OUTPATIENT
Start: 2022-12-30

## 2022-12-30 RX ORDER — ONDANSETRON 4 MG/1
4 TABLET, ORALLY DISINTEGRATING ORAL EVERY 6 HOURS PRN
Status: CANCELLED | OUTPATIENT
Start: 2022-12-30

## 2022-12-30 RX ORDER — NALOXONE HYDROCHLORIDE 0.4 MG/ML
0.2 INJECTION, SOLUTION INTRAMUSCULAR; INTRAVENOUS; SUBCUTANEOUS
Status: CANCELLED | OUTPATIENT
Start: 2022-12-30

## 2023-01-03 ENCOUNTER — ANESTHESIA EVENT (OUTPATIENT)
Dept: SURGERY | Facility: AMBULATORY SURGERY CENTER | Age: 48
End: 2023-01-03
Payer: COMMERCIAL

## 2023-01-03 NOTE — ANESTHESIA PREPROCEDURE EVALUATION
Anesthesia Pre-Procedure Evaluation    Patient: Jose Hernnadez   MRN: [id number] : 1975        Procedure : Procedure(s):  COLONOSCOPY, WITH CO2 INSUFFLATION          Past Medical History:   Diagnosis Date     Benign essential hypertension       Past Surgical History:   Procedure Laterality Date     APPENDECTOMY  around      KNEE SURGERY Left       No Known Allergies   Social History     Tobacco Use     Smoking status: Never     Smokeless tobacco: Never   Substance Use Topics     Alcohol use: Yes     Comment: rarely      Wt Readings from Last 1 Encounters:   22 104.3 kg (230 lb)           Physical Exam    Airway        Mallampati: I   TM distance: > 3 FB   Neck ROM: full   Mouth opening: > 3 cm    Respiratory Devices and Support         Dental  no notable dental history         Cardiovascular   cardiovascular exam normal          Pulmonary   pulmonary exam normal                OUTSIDE LABS:  CBC:   Lab Results   Component Value Date    WBC 6.2 2015    HGB 15.5 2019    HGB 15.1 2015    HCT 44.7 2015     2015     BMP:   Lab Results   Component Value Date     2022     2019    POTASSIUM 3.6 2022    POTASSIUM 4.1 2019    CHLORIDE 105 2022    CHLORIDE 107 2019    CO2 29 2022    CO2 31 2019    BUN 12 2022    BUN 12 2019    CR 1.02 2022    CR 1.06 2019     (H) 2022    GLC 91 2019     COAGS: No results found for: PTT, INR, FIBR  POC: No results found for: BGM, HCG, HCGS  HEPATIC:   Lab Results   Component Value Date    ALBUMIN 4.2 2022    PROTTOTAL 7.4 2022    ALT 51 2022    AST 62 (H) 2022    ALKPHOS 72 2022    BILITOTAL 0.6 2022     OTHER:   Lab Results   Component Value Date    A1C 5.3 2022    ADVID 8.7 2022    TSH 1.15 2015       Anesthesia Plan    ASA Status:  2   NPO Status:  NPO Appropriate    Anesthesia Type:  MAC.     - Reason for MAC: straight local not clinically adequate   Induction: Intravenous, Propofol.   Maintenance: TIVA.        Consents    Anesthesia Plan(s) and associated risks, benefits, and realistic alternatives discussed. Questions answered and patient/representative(s) expressed understanding.    - Discussed:     - Discussed with:  Spouse, Patient      - Extended Intubation/Ventilatory Support Discussed: No.      - Patient is DNR/DNI Status: No    Use of blood products discussed: No .     Postoperative Care       PONV prophylaxis: Ondansetron (or other 5HT-3), Background Propofol Infusion     Comments:                Pio Cano MD

## 2023-01-05 ENCOUNTER — ANESTHESIA (OUTPATIENT)
Dept: SURGERY | Facility: AMBULATORY SURGERY CENTER | Age: 48
End: 2023-01-05
Payer: COMMERCIAL

## 2023-01-05 ENCOUNTER — HOSPITAL ENCOUNTER (OUTPATIENT)
Facility: AMBULATORY SURGERY CENTER | Age: 48
Discharge: HOME | End: 2023-01-05
Attending: INTERNAL MEDICINE | Admitting: SURGERY
Payer: COMMERCIAL

## 2023-01-05 VITALS
BODY MASS INDEX: 32.93 KG/M2 | HEART RATE: 82 BPM | RESPIRATION RATE: 16 BRPM | DIASTOLIC BLOOD PRESSURE: 99 MMHG | OXYGEN SATURATION: 96 % | HEIGHT: 70 IN | TEMPERATURE: 97.5 F | SYSTOLIC BLOOD PRESSURE: 141 MMHG | WEIGHT: 230 LBS

## 2023-01-05 VITALS — HEART RATE: 81 BPM

## 2023-01-05 DIAGNOSIS — Z12.11 SCREEN FOR COLON CANCER: Primary | ICD-10-CM

## 2023-01-05 LAB — COLONOSCOPY: NORMAL

## 2023-01-05 PROCEDURE — 45378 DIAGNOSTIC COLONOSCOPY: CPT

## 2023-01-05 PROCEDURE — G8918 PT W/O PREOP ORDER IV AB PRO: HCPCS

## 2023-01-05 PROCEDURE — G8907 PT DOC NO EVENTS ON DISCHARG: HCPCS

## 2023-01-05 RX ORDER — ONDANSETRON 2 MG/ML
4 INJECTION INTRAMUSCULAR; INTRAVENOUS
Status: DISCONTINUED | OUTPATIENT
Start: 2023-01-05 | End: 2023-01-06 | Stop reason: HOSPADM

## 2023-01-05 RX ORDER — PROPOFOL 10 MG/ML
INJECTION, EMULSION INTRAVENOUS PRN
Status: DISCONTINUED | OUTPATIENT
Start: 2023-01-05 | End: 2023-01-05

## 2023-01-05 RX ORDER — SODIUM CHLORIDE, SODIUM LACTATE, POTASSIUM CHLORIDE, CALCIUM CHLORIDE 600; 310; 30; 20 MG/100ML; MG/100ML; MG/100ML; MG/100ML
INJECTION, SOLUTION INTRAVENOUS CONTINUOUS
Status: DISCONTINUED | OUTPATIENT
Start: 2023-01-05 | End: 2023-01-06 | Stop reason: HOSPADM

## 2023-01-05 RX ORDER — LIDOCAINE HYDROCHLORIDE 20 MG/ML
INJECTION, SOLUTION INFILTRATION; PERINEURAL PRN
Status: DISCONTINUED | OUTPATIENT
Start: 2023-01-05 | End: 2023-01-05

## 2023-01-05 RX ORDER — PROPOFOL 10 MG/ML
INJECTION, EMULSION INTRAVENOUS CONTINUOUS PRN
Status: DISCONTINUED | OUTPATIENT
Start: 2023-01-05 | End: 2023-01-05

## 2023-01-05 RX ORDER — LIDOCAINE 40 MG/G
CREAM TOPICAL
Status: DISCONTINUED | OUTPATIENT
Start: 2023-01-05 | End: 2023-01-06 | Stop reason: HOSPADM

## 2023-01-05 RX ADMIN — SODIUM CHLORIDE, SODIUM LACTATE, POTASSIUM CHLORIDE, CALCIUM CHLORIDE: 600; 310; 30; 20 INJECTION, SOLUTION INTRAVENOUS at 14:06

## 2023-01-05 RX ADMIN — PROPOFOL 150 MCG/KG/MIN: 10 INJECTION, EMULSION INTRAVENOUS at 14:11

## 2023-01-05 RX ADMIN — PROPOFOL 70 MG: 10 INJECTION, EMULSION INTRAVENOUS at 14:11

## 2023-01-05 RX ADMIN — LIDOCAINE HYDROCHLORIDE 80 MG: 20 INJECTION, SOLUTION INFILTRATION; PERINEURAL at 14:08

## 2023-01-05 RX ADMIN — PROPOFOL 30 MG: 10 INJECTION, EMULSION INTRAVENOUS at 14:12

## 2023-01-05 NOTE — ANESTHESIA POSTPROCEDURE EVALUATION
Patient: Jose Hernandez    Procedure: Procedure(s):  COLONOSCOPY, WITH CO2 INSUFFLATION       Anesthesia Type:  MAC    Note:  Disposition: Outpatient   Postop Pain Control: Uneventful            Sign Out: Well controlled pain   PONV:    Neuro/Psych: Uneventful            Sign Out: Acceptable/Baseline neuro status   Airway/Respiratory: Uneventful            Sign Out: Acceptable/Baseline resp. status   CV/Hemodynamics: Uneventful            Sign Out: Acceptable CV status; No obvious hypovolemia; No obvious fluid overload   Other NRE:    DID A NON-ROUTINE EVENT OCCUR?            Last vitals:  Vitals Value Taken Time   /89 01/05/23 1435   Temp     Pulse     Resp 16 01/05/23 1435   SpO2 95 % 01/05/23 1435       Electronically Signed By: Pio Cano MD  January 5, 2023  2:47 PM

## 2023-01-05 NOTE — ANESTHESIA CARE TRANSFER NOTE
Patient: Jose Hernandez    Procedure: Procedure(s):  COLONOSCOPY, WITH CO2 INSUFFLATION       Diagnosis: Colon cancer screening [Z12.11]  Diagnosis Additional Information: No value filed.    Anesthesia Type:   MAC     Note:    Oropharynx: oropharynx clear of all foreign objects and spontaneously breathing  Level of Consciousness: drowsy  Oxygen Supplementation: room air    Independent Airway: airway patency satisfactory and stable    Vital Signs Stable: post-procedure vital signs reviewed and stable  Report to RN Given: handoff report given  Patient transferred to: Phase II    Handoff Report: Identifed the Patient, Identified the Reponsible Provider, Reviewed the pertinent medical history, Discussed the surgical course, Reviewed Intra-OP anesthesia mangement and issues during anesthesia, Set expectations for post-procedure period and Allowed opportunity for questions and acknowledgement of understanding      Vitals:  Vitals Value Taken Time   BP     Temp     Pulse     Resp     SpO2         Electronically Signed By: STEPH Peterson CRNA  January 5, 2023  2:31 PM

## 2023-01-05 NOTE — INTERVAL H&P NOTE
"I have reviewed the surgical (or preoperative) H&P that is linked to this encounter, and examined the patient. There are no significant changes    Clinical Conditions Present on Arrival:  Clinically Significant Risk Factors Present on Admission                    # Obesity: Estimated body mass index is 33 kg/m  as calculated from the following:    Height as of this encounter: 1.778 m (5' 10\").    Weight as of this encounter: 104.3 kg (230 lb).       "

## 2023-09-18 DIAGNOSIS — I10 ESSENTIAL HYPERTENSION WITH GOAL BLOOD PRESSURE LESS THAN 140/90: ICD-10-CM

## 2023-09-18 DIAGNOSIS — E78.1 HYPERTRIGLYCERIDEMIA: ICD-10-CM

## 2023-09-18 RX ORDER — FENOFIBRATE 67 MG/1
67 CAPSULE ORAL
Qty: 90 CAPSULE | Refills: 1 | Status: SHIPPED | OUTPATIENT
Start: 2023-09-18

## 2023-09-18 RX ORDER — AMLODIPINE BESYLATE 5 MG/1
5 TABLET ORAL AT BEDTIME
Qty: 90 TABLET | Refills: 1 | Status: SHIPPED | OUTPATIENT
Start: 2023-09-18 | End: 2024-03-12

## 2023-10-28 ENCOUNTER — HEALTH MAINTENANCE LETTER (OUTPATIENT)
Age: 48
End: 2023-10-28

## 2023-12-28 DIAGNOSIS — E78.1 HYPERTRIGLYCERIDEMIA: ICD-10-CM

## 2023-12-28 RX ORDER — FENOFIBRATE 67 MG/1
67 CAPSULE ORAL
Qty: 90 CAPSULE | Refills: 1 | OUTPATIENT
Start: 2023-12-28

## 2023-12-28 NOTE — TELEPHONE ENCOUNTER
IR RN NOTE:    Consent obtained by Dr. Tirado from patient herself, all questions answered. Consent signed and witnessed, placed in chart.     Left nephrostomy tube placement performed by Dr. Tirado, assisted by RTs Juan and Essie. 8 F x 25 cm Flexima Regular nephrostomy tube placed, Ref #[id number], Lot #[id number], Exp. Date 07/15/2022.     Drain to left back dressed with gauze and tegaderm, dressing CDI.     ETCO2 34-42 intraprocedure. Pt tolerated procedure, see flowsheet for vitals documentation. Pt drowsy post procedure but arousable to voice and following commands. O2 sats 93-96% on room air.    Report given to Nida Machado RN. Pt transported to room on Nida 5 accompanied by RN and care transferred to receiving RN. Sign off sheet completed.   Pharmacy requested refills that are already active on file. Refused request to pharmacy.

## 2024-01-11 ENCOUNTER — OFFICE VISIT (OUTPATIENT)
Dept: FAMILY MEDICINE | Facility: CLINIC | Age: 49
End: 2024-01-11
Payer: COMMERCIAL

## 2024-01-11 VITALS
HEART RATE: 81 BPM | WEIGHT: 241.2 LBS | SYSTOLIC BLOOD PRESSURE: 150 MMHG | DIASTOLIC BLOOD PRESSURE: 107 MMHG | HEIGHT: 70 IN | TEMPERATURE: 97.8 F | BODY MASS INDEX: 34.53 KG/M2 | RESPIRATION RATE: 16 BRPM | OXYGEN SATURATION: 96 %

## 2024-01-11 DIAGNOSIS — I10 ESSENTIAL HYPERTENSION WITH GOAL BLOOD PRESSURE LESS THAN 140/90: ICD-10-CM

## 2024-01-11 DIAGNOSIS — E78.1 HYPERTRIGLYCERIDEMIA: ICD-10-CM

## 2024-01-11 DIAGNOSIS — Z12.5 SCREENING PSA (PROSTATE SPECIFIC ANTIGEN): Primary | ICD-10-CM

## 2024-01-11 PROCEDURE — 99214 OFFICE O/P EST MOD 30 MIN: CPT | Performed by: FAMILY MEDICINE

## 2024-01-11 RX ORDER — LOSARTAN POTASSIUM AND HYDROCHLOROTHIAZIDE 12.5; 5 MG/1; MG/1
TABLET ORAL
Qty: 90 TABLET | Refills: 1 | Status: SHIPPED | OUTPATIENT
Start: 2024-01-11 | End: 2024-07-14

## 2024-01-11 RX ORDER — FENOFIBRATE 145 MG/1
145 TABLET, COATED ORAL DAILY
Qty: 90 TABLET | Refills: 1 | Status: SHIPPED | OUTPATIENT
Start: 2024-01-11 | End: 2024-07-16

## 2024-01-11 ASSESSMENT — PAIN SCALES - GENERAL: PAINLEVEL: NO PAIN (0)

## 2024-01-11 NOTE — PROGRESS NOTES
ASSESSMENT / PLAN:  (I10) Essential hypertension with goal blood pressure less than 140/90  Comment: needs help. Was on lisinopril/hydrochlorothiazide in past and liked better - doesn't remember side effects.   Plan: losartan-hydrochlorothiazide (HYZAAR) 50-12.5         MG tablet        Fasitng labs in 2 weeks set-up. Exercise and continue lower sodium diet/exercise and weight loss. Recheck in 6 months   Sooner if worse/new issues. Call/email with questions/concerns      (E78.1) Hypertriglyceridemia  Comment: non-fasting  Plan: fenofibrate (TRICOR) 145 MG tablet        Will double dosage. Lower carb diet and more weight lifting. Fasting labsin 2 weeks. Reveiwed risks and side effects of medication  Chest pain or shortness of breath to er.       Justin Whitman is a 48 year old, presenting for the following health issues:  Follow-up htn, obesity and high triglycerides   Gym membership. Weight lifting and cardio.   Needs to lower carbs. No pop. Some relative fasting.    Caffeine - one cup coffee/day.   ALCOHOL = occasionally. No chest pain or shortness of breath.   Dad with dm. No family history mi/cva.   Younger borther.   Non-smoker. Home blood pressure borderline high.   Lower sodium in diet. Emotionaly doing ok.   Amodipine some heart flutters.   Hypertension and Hyperlipidemia      1/11/2024     4:51 PM   Additional Questions   Roomed by LAUREN Stuart CMA       History of Present Illness       Hyperlipidemia:  He presents for follow up of hyperlipidemia.   He is taking medication to lower cholesterol. He is not having myalgia or other side effects to statin medications.    Hypertension: He presents for follow up of hypertension.  He does check blood pressure  regularly outside of the clinic. Outside blood pressures have been over 140/90. He follows a low salt diet.     He eats 2-3 servings of fruits and vegetables daily.He consumes 0 sweetened beverage(s) daily.He exercises with enough effort to increase his heart  "rate 30 to 60 minutes per day.  He exercises with enough effort to increase his heart rate 4 days per week.   He is taking medications regularly.           Objective    BP (!) 150/107   Pulse 81   Temp 97.8  F (36.6  C) (Oral)   Resp 16   Ht 1.778 m (5' 10\")   Wt 109.4 kg (241 lb 3.2 oz)   SpO2 96%   BMI 34.61 kg/m     Physical Exam   GENERAL: healthy, alert and no distress  EYES: Eyes grossly normal to inspection, PERRL and conjunctivae and sclerae normal  NECK: no adenopathy, no asymmetry, masses, or scars and thyroid normal to palpation  RESP: lungs clear to auscultation - no rales, rhonchi or wheezes  CV: regular rate and rhythm, normal S1 S2, no S3 or S4, no murmur, click or rub, no peripheral edema and peripheral pulses strong  ABDOMEN: soft, nontender, no hepatosplenomegaly, no masses and bowel sounds normal  MS: no gross musculoskeletal defects noted, no edema  PSYCH: mentation appears normal, affect normal/bright            "

## 2024-01-24 ENCOUNTER — LAB (OUTPATIENT)
Dept: LAB | Facility: CLINIC | Age: 49
End: 2024-01-24
Payer: COMMERCIAL

## 2024-01-24 DIAGNOSIS — E78.1 HYPERTRIGLYCERIDEMIA: ICD-10-CM

## 2024-01-24 DIAGNOSIS — I10 ESSENTIAL HYPERTENSION WITH GOAL BLOOD PRESSURE LESS THAN 140/90: ICD-10-CM

## 2024-01-24 DIAGNOSIS — Z12.5 SCREENING PSA (PROSTATE SPECIFIC ANTIGEN): ICD-10-CM

## 2024-01-24 LAB
ALBUMIN SERPL BCG-MCNC: 4.3 G/DL (ref 3.5–5.2)
ALP SERPL-CCNC: 56 U/L (ref 40–150)
ALT SERPL W P-5'-P-CCNC: 46 U/L (ref 0–70)
ANION GAP SERPL CALCULATED.3IONS-SCNC: 11 MMOL/L (ref 7–15)
AST SERPL W P-5'-P-CCNC: 31 U/L (ref 0–45)
BILIRUB SERPL-MCNC: 0.3 MG/DL
BUN SERPL-MCNC: 13.4 MG/DL (ref 6–20)
CALCIUM SERPL-MCNC: 9 MG/DL (ref 8.6–10)
CHLORIDE SERPL-SCNC: 105 MMOL/L (ref 98–107)
CHOLEST SERPL-MCNC: 226 MG/DL
CREAT SERPL-MCNC: 1.22 MG/DL (ref 0.67–1.17)
DEPRECATED HCO3 PLAS-SCNC: 24 MMOL/L (ref 22–29)
EGFRCR SERPLBLD CKD-EPI 2021: 73 ML/MIN/1.73M2
FASTING STATUS PATIENT QL REPORTED: YES
GLUCOSE SERPL-MCNC: 111 MG/DL (ref 70–99)
HDLC SERPL-MCNC: 35 MG/DL
LDLC SERPL CALC-MCNC: 133 MG/DL
NONHDLC SERPL-MCNC: 191 MG/DL
POTASSIUM SERPL-SCNC: 4 MMOL/L (ref 3.4–5.3)
PROT SERPL-MCNC: 6.7 G/DL (ref 6.4–8.3)
PSA SERPL DL<=0.01 NG/ML-MCNC: 0.66 NG/ML (ref 0–2.5)
SODIUM SERPL-SCNC: 140 MMOL/L (ref 135–145)
TRIGL SERPL-MCNC: 290 MG/DL

## 2024-01-24 PROCEDURE — 80053 COMPREHEN METABOLIC PANEL: CPT

## 2024-01-24 PROCEDURE — 36415 COLL VENOUS BLD VENIPUNCTURE: CPT

## 2024-01-24 PROCEDURE — G0103 PSA SCREENING: HCPCS

## 2024-01-24 PROCEDURE — 80061 LIPID PANEL: CPT

## 2024-03-12 DIAGNOSIS — I10 ESSENTIAL HYPERTENSION WITH GOAL BLOOD PRESSURE LESS THAN 140/90: ICD-10-CM

## 2024-03-14 RX ORDER — AMLODIPINE BESYLATE 5 MG/1
5 TABLET ORAL AT BEDTIME
Qty: 90 TABLET | Refills: 1 | Status: SHIPPED | OUTPATIENT
Start: 2024-03-14

## 2024-07-12 DIAGNOSIS — I10 ESSENTIAL HYPERTENSION WITH GOAL BLOOD PRESSURE LESS THAN 140/90: ICD-10-CM

## 2024-07-14 RX ORDER — LOSARTAN POTASSIUM AND HYDROCHLOROTHIAZIDE 12.5; 5 MG/1; MG/1
TABLET ORAL
Qty: 90 TABLET | Refills: 1 | Status: SHIPPED | OUTPATIENT
Start: 2024-07-14

## 2024-07-16 ENCOUNTER — MYC MEDICAL ADVICE (OUTPATIENT)
Dept: FAMILY MEDICINE | Facility: CLINIC | Age: 49
End: 2024-07-16
Payer: COMMERCIAL

## 2024-07-16 DIAGNOSIS — E78.1 HYPERTRIGLYCERIDEMIA: ICD-10-CM

## 2024-07-16 RX ORDER — FENOFIBRATE 145 MG/1
145 TABLET, COATED ORAL DAILY
Qty: 90 TABLET | Refills: 0 | Status: SHIPPED | OUTPATIENT
Start: 2024-07-16

## 2024-07-19 DIAGNOSIS — E78.1 HYPERTRIGLYCERIDEMIA: ICD-10-CM

## 2024-07-19 RX ORDER — FENOFIBRATE 145 MG/1
TABLET, COATED ORAL
Qty: 90 TABLET | Refills: 0 | OUTPATIENT
Start: 2024-07-19

## 2024-11-06 ENCOUNTER — MYC REFILL (OUTPATIENT)
Dept: FAMILY MEDICINE | Facility: CLINIC | Age: 49
End: 2024-11-06
Payer: COMMERCIAL

## 2024-11-06 DIAGNOSIS — E78.1 HYPERTRIGLYCERIDEMIA: ICD-10-CM

## 2024-11-07 RX ORDER — FENOFIBRATE 145 MG/1
145 TABLET, COATED ORAL DAILY
Qty: 90 TABLET | Refills: 0 | Status: SHIPPED | OUTPATIENT
Start: 2024-11-07

## 2024-11-27 ENCOUNTER — E-VISIT (OUTPATIENT)
Dept: URGENT CARE | Facility: CLINIC | Age: 49
End: 2024-11-27
Payer: COMMERCIAL

## 2024-11-27 DIAGNOSIS — J01.90 ACUTE SINUSITIS, RECURRENCE NOT SPECIFIED, UNSPECIFIED LOCATION: Primary | ICD-10-CM

## 2024-11-27 NOTE — PATIENT INSTRUCTIONS
Acute Sinusitis: Care Instructions  Overview     Acute sinusitis is an inflammation of the mucous membranes inside the nose and sinuses. Sinuses are the hollow spaces in your skull around the eyes and nose. Acute sinusitis often follows a cold. Acute sinusitis causes thick, discolored mucus that drains from the nose or down the back of the throat. It also can cause pain and pressure in your head and face along with a stuffy or blocked nose.  In most cases, sinusitis gets better on its own in 1 to 2 weeks. But some mild symptoms may last for several weeks. Sometimes antibiotics are needed if there is a bacterial infection.  Follow-up care is a key part of your treatment and safety. Be sure to make and go to all appointments, and call your doctor if you are having problems. It's also a good idea to know your test results and keep a list of the medicines you take.  How can you care for yourself at home?  Use saline (saltwater) nasal washes. This can help keep your nasal passages open and wash out mucus and allergens.  You can buy saline nose washes at a grocery store or drugstore. Follow the instructions on the package.  You can make your own at home. Add 1 teaspoon of non-iodized salt and 1 teaspoon of baking soda to 2 cups of distilled or boiled and cooled water. Fill a squeeze bottle or a nasal cleansing pot (such as a neti pot) with the nasal wash. Then put the tip into your nostril, and lean over the sink. With your mouth open, gently squirt the liquid. Repeat on the other side.  Try a decongestant nasal spray like oxymetazoline (Afrin). Do not use it for more than 3 days in a row. Using it for more than 3 days can make your congestion worse.  If needed, take an over-the-counter pain medicine, such as acetaminophen (Tylenol), ibuprofen (Advil, Motrin), or naproxen (Aleve). Read and follow all instructions on the label.  If the doctor prescribed antibiotics, take them as directed. Do not stop taking them just  "because you feel better. You need to take the full course of antibiotics.  Be careful when taking over-the-counter cold or flu medicines and Tylenol at the same time. Many of these medicines have acetaminophen, which is Tylenol. Read the labels to make sure that you are not taking more than the recommended dose. Too much acetaminophen (Tylenol) can be harmful.  Try a steroid nasal spray. It may help with your symptoms.  Breathe warm, moist air. You can use a steamy shower, a hot bath, or a sink filled with hot water. Avoid cold, dry air. Using a humidifier in your home may help. Follow the directions for cleaning the machine.  When should you call for help?   Call your doctor now or seek immediate medical care if:    You have new or worse swelling, redness, or pain in your face or around one or both of your eyes.     You have double vision or a change in your vision.     You have a high fever.     You have a severe headache and a stiff neck.     You have mental changes, such as feeling confused or much less alert.   Watch closely for changes in your health, and be sure to contact your doctor if:    You are not getting better as expected.   Where can you learn more?  Go to https://www.Personal[employer].net/patiented  Enter I933 in the search box to learn more about \"Acute Sinusitis: Care Instructions.\"  Current as of: September 27, 2023  Content Version: 14.2 2024 [facility] [employer].   Care instructions adapted under license by your healthcare professional. If you have questions about a medical condition or this instruction, always ask your healthcare professional. [employer] disclaims any warranty or liability for your use of this information.    You may want to try a nasal lavage (also known as nasal irrigation). You can find over-the-counter products, such as Neti-Pot, at retail locations or make your own at home. Instructions for homemade nasal lavage and more information on the process are " available online at http://www.aafp.org/afp/2009/1115/p1121.html.    Dear Jose Hernandez    After reviewing your responses, I've been able to diagnose you with Acute sinusitis, recurrence not specified, unspecified location.      Based on your responses and diagnosis, I have prescribed   Orders Placed This Encounter   Medications     amoxicillin-clavulanate (AUGMENTIN) 875-125 MG tablet     Sig: Take 1 tablet by mouth 2 times daily for 7 days.     Dispense:  14 tablet     Refill:  0      to treat your symptoms. I have sent this to your pharmacy.?     It is also important to stay well hydrated, get lots of rest and take over-the-counter decongestants,?tylenol?or ibuprofen if you?are able to?take those medications per your primary care provider to help relieve discomfort.?     It is important that you take?all of?your prescribed medication even if your symptoms are improving after a few doses.? Taking?all of?your medicine helps prevent the symptoms from returning.?     If your symptoms worsen, you develop severe headache, vomiting, high fever (>102), or are not improving in 7 days, please contact your primary care provider for an appointment or visit any of our convenient Walk-in Care or Urgent Care Centers to be seen which can be found on our website?here.?     Thanks again for choosing?us?as your health care partner,?   ?  Pedro Randall MD, MD?   Thank you for choosing us for your care. I have placed an order for a prescription so that you can start treatment. View your full visit summary for details by clicking on the link below. Your pharmacist will able to address any questions you may have about the medication.     If you're not feeling better within 5-7 days, please schedule an appointment.  You can schedule an appointment right here in [facility], or call [phone]  If the visit is for the same symptoms as your eVisit, we'll refund the cost of your eVisit if seen within seven days.

## 2024-12-21 ENCOUNTER — HEALTH MAINTENANCE LETTER (OUTPATIENT)
Age: 49
End: 2024-12-21

## 2025-02-04 DIAGNOSIS — E78.1 HYPERTRIGLYCERIDEMIA: ICD-10-CM

## 2025-02-05 RX ORDER — FENOFIBRATE 145 MG/1
145 TABLET, COATED ORAL DAILY
Qty: 90 TABLET | Refills: 0 | Status: SHIPPED | OUTPATIENT
Start: 2025-02-05

## 2025-05-02 DIAGNOSIS — E78.1 HYPERTRIGLYCERIDEMIA: ICD-10-CM

## 2025-05-02 RX ORDER — FENOFIBRATE 145 MG/1
145 TABLET, FILM COATED ORAL DAILY
Qty: 90 TABLET | Refills: 0 | OUTPATIENT
Start: 2025-05-02

## 2025-05-02 NOTE — LETTER
May 7, 2025    Jose Hernandez  [address] [facility] [address]    Dear Jose,       We recently received a refill request for fenofibrate (TRICOR) 145 MG tablet .  We were unable to refill this  because you are due for a:    Medication check office visit-you have not been seen in the clinic for over 1 year.      Please call at your earliest convenience so that there will not be a delay with your future refills.          Thank you,   Your [facility] Team/  [phone]         Electronically signed

## 2025-05-05 NOTE — TELEPHONE ENCOUNTER
RX-fenofibrate (TRICOR) 145 MG tablet     Contacts       Contact Date/Time Type Contact Phone/Fax    05/02/2025 12:43 PM CDT Interface (Incoming) [id number] - [place] - [address] [facility] (Pharmacy) [phone]    05/05/2025 10:33 AM CDT Phone (Outgoing) J Carlos Hernandez (Self) [phone] (H)    Not Available           Attempted to reach patient to: Schedule an appointment    When patient returns call, please take this action: Assist with scheduling    Reason for the visit:  Refill fenofibrate    When to schedule: Next available    Additional comments/info: Tried calling, phone rang and stopped.    If unable to schedule: Add a note to the telephone encounter noting the barrier and route back to primary care team sandi KOCH [facility]    I sent a [employer] message to the [facility]

## 2025-05-06 NOTE — TELEPHONE ENCOUNTER
Tried calling patient again, Phone rang a few times and quit ringing.Brooke KOCH [facility]

## 2025-06-02 ENCOUNTER — OFFICE VISIT (OUTPATIENT)
Dept: FAMILY MEDICINE | Facility: CLINIC | Age: 50
End: 2025-06-02
Payer: COMMERCIAL

## 2025-06-02 VITALS
HEIGHT: 70 IN | BODY MASS INDEX: 36.36 KG/M2 | RESPIRATION RATE: 20 BRPM | SYSTOLIC BLOOD PRESSURE: 130 MMHG | DIASTOLIC BLOOD PRESSURE: 98 MMHG | TEMPERATURE: 97.6 F | OXYGEN SATURATION: 96 % | HEART RATE: 76 BPM | WEIGHT: 254 LBS

## 2025-06-02 DIAGNOSIS — E78.1 HYPERTRIGLYCERIDEMIA: ICD-10-CM

## 2025-06-02 DIAGNOSIS — R06.83 SNORING: ICD-10-CM

## 2025-06-02 DIAGNOSIS — I10 ESSENTIAL HYPERTENSION WITH GOAL BLOOD PRESSURE LESS THAN 140/90: ICD-10-CM

## 2025-06-02 DIAGNOSIS — Z00.00 ROUTINE GENERAL MEDICAL EXAMINATION AT A HEALTH CARE FACILITY: Primary | ICD-10-CM

## 2025-06-02 DIAGNOSIS — Z12.5 SCREENING PSA (PROSTATE SPECIFIC ANTIGEN): ICD-10-CM

## 2025-06-02 PROCEDURE — 99213 OFFICE O/P EST LOW 20 MIN: CPT | Mod: 25 | Performed by: FAMILY MEDICINE

## 2025-06-02 PROCEDURE — 1126F AMNT PAIN NOTED NONE PRSNT: CPT | Performed by: FAMILY MEDICINE

## 2025-06-02 PROCEDURE — 3075F SYST BP GE 130 - 139MM HG: CPT | Performed by: FAMILY MEDICINE

## 2025-06-02 PROCEDURE — 3080F DIAST BP >= 90 MM HG: CPT | Performed by: FAMILY MEDICINE

## 2025-06-02 PROCEDURE — 99396 PREV VISIT EST AGE 40-64: CPT | Performed by: FAMILY MEDICINE

## 2025-06-02 RX ORDER — BUPROPION HYDROCHLORIDE 150 MG/1
TABLET, EXTENDED RELEASE ORAL
Qty: 60 TABLET | Refills: 3 | Status: SHIPPED | OUTPATIENT
Start: 2025-06-02

## 2025-06-02 RX ORDER — FENOFIBRATE 145 MG/1
145 TABLET, FILM COATED ORAL DAILY
Qty: 90 TABLET | Refills: 1 | Status: SHIPPED | OUTPATIENT
Start: 2025-06-02

## 2025-06-02 RX ORDER — LOSARTAN POTASSIUM AND HYDROCHLOROTHIAZIDE 12.5; 5 MG/1; MG/1
TABLET ORAL
Qty: 90 TABLET | Refills: 1 | Status: SHIPPED | OUTPATIENT
Start: 2025-06-02

## 2025-06-02 ASSESSMENT — PAIN SCALES - GENERAL: PAINLEVEL_OUTOF10: NO PAIN (0)

## 2025-06-02 NOTE — PROGRESS NOTES
"Preventive Care Visit  [facility]  Mehran Armstrong MD, Family Medicine  Jun 2, 2025      Assessment & Plan     ASSESSMENT / PLAN:  (Z00.00) Routine general medical examination at a health care facility  (primary encounter diagnosis)  Comment: generally normal exam  Plan: CBC with platelets        Reviewed self mole/testicle check handout.  vitaminD.   Future labs set-up  (I10) Essential hypertension with goal blood pressure less than 140/90  Comment: a little high  Plan: BASIC METABOLIC PANEL,         losartan-hydrochlorothiazide (HYZAAR) 50-12.5         MG tablet        Self-monitor/exercise. Double dosage if worse. Chest pain or shortness of breath to er.     (E78.1) Hypertriglyceridemia  Plan: Lipid panel reflex to direct LDL Non-fasting,         fenofibrate (TRICOR) 145 MG tablet        Future labs. Low carb diet    (Z12.5) Screening PSA (prostate specific antigen)  C  Plan: Prostate Specific Antigen Screen        Future labs.     (R06.83) Snoring  Comment: possibly gianna  Plan: Adult Sleep Eval & Management          Referral        Weight loss/sleep study    (Z68.36) BMI 36.0-36.9,adult  Comment: needs help. Lack of motiviation  Plan: Adult Sleep Eval & Management          Referral, buPROPion (WELLBUTRIN SR) 150 MG 12         hr tablet        Better sleep might help too. Reveiwed risks and side effects of medication  Consider ozempic. Weight lifting/exercise and lower carb diet.             BMI  Estimated body mass index is 36.45 kg/m  as calculated from the following:    Height as of this encounter: 1.778 m (5' 10\").    Weight as of this encounter: 115.2 kg (254 lb).             Subjective   J Carlos is a 49 year old, presenting for the following:  Physical    Follow-up htn, obesity and high triglycerides   Had colonoscopy 2023  Caffeine - one cup coffee/day.   ALCOHOL = occasionally. No chest pain or shortness of breath.   Dad with dm. No family history mi/cva.   Younger " borther.   Breakfast - limited. Lunch. No nausea, vomiting or diarrhea or black/bloody stools. No urine changes. No std concerns. No testicle/masses/hernia.  Non-smoker. Home blood pressure borderline high.   Lower sodium in diet. Emotionaly doing ok.   Amodipine some heart flutters.  No outside blood pressure.   , 4 (23-16). Emotionally doing ok.   Sleep - some snoring. No family history gianna.   Daytime fatigue. No mole changes.   MVI. Eats meat/eggs.       6/2/2025     4:35 PM   Additional Questions   Roomed by Nneka   Accompanied by self         6/2/2025     4:35 PM   Patient Reported Additional Medications   Patient reports taking the following new medications n/a          Healthy Habits:     Taking medications regularly:  0  History of Present Illness       Hyperlipidemia:  He presents for follow up of hyperlipidemia.   He is taking medication to lower cholesterol. He is not having myalgia or other side effects to statin medications.    Hypertension: He presents for follow up of hypertension.  He does not check blood pressure  regularly outside of the clinic. Outside blood pressures have been over 140/90. He does not follow a low salt diet. He consumes 0 sweetened beverage(s) daily.He exercises with enough effort to increase his heart rate 20 to 29 minutes per day.  He exercises with enough effort to increase his heart rate 4 days per week.   He is taking medications regularly.             Advance Care Planning    Discussed advance care planning with patient; however, patient declined at this time.         No data to display                  8/11/2022   Nutrition   Three or more servings of calcium each day? Yes   Diet: low salt         8/11/2022   Exercise   Frequency of exercise: 2-3 days/week         1/11/2024   Social Factors   Worry food won't last until get money to buy more No   Food not last or not have enough money for food? No   Do you have housing? (Housing is defined as stable permanent  "housing and does not include staying outside in a car, in a tent, in an abandoned building, in an overnight shelter, or couch-surfing.) Yes   Are you worried about losing your housing? No   Lack of transportation? No   Unable to get utilities (heat,electricity)? No         8/11/2022   Dental   Dentist two times every year? Yes         Today's PHQ-2 Score:       6/2/2025     8:15 AM   PHQ-2 ( 1999 [employer])   Q1: Little interest or pleasure in doing things 0   Q2: Feeling down, depressed or hopeless 0   PHQ-2 Score 0    Q1: Little interest or pleasure in doing things Not at all   Q2: Feeling down, depressed or hopeless Not at all   PHQ-2 Score 0       Patient-reported           8/11/2022   Substance Use   Alcohol more than 3/day or more than 7/wk No     Social History     Tobacco Use    Smoking status: Never    Smokeless tobacco: Never   Vaping Use    Vaping status: Never Used   Substance Use Topics    Alcohol use: Yes     Comment: rarely    Drug use: No       ASCVD Risk   The 10-year ASCVD risk score (Hannah BRIDGES, et al., 2019) is: 7.2%    Values used to calculate the score:      Age: 49 years      Sex: Male      Is Non- : No      Diabetic: No      Tobacco smoker: No      Systolic Blood Pressure: 130 mmHg      Is BP treated: Yes      HDL Cholesterol: 35 mg/dL      Total Cholesterol: 226 mg/dL             No data to display                 Reviewed and updated as needed this visit by Provider                       Objective    Exam  BP (!) 130/98   Pulse 76   Temp 97.6  F (36.4  C) (Tympanic)   Resp 20   Ht 1.778 m (5' 10\")   Wt 115.2 kg (254 lb)   SpO2 96%   BMI 36.45 kg/m     Estimated body mass index is 36.45 kg/m  as calculated from the following:    Height as of this encounter: 1.778 m (5' 10\").    Weight as of this encounter: 115.2 kg (254 lb).    Physical Exam  GENERAL: alert and no distress  EYES: Eyes grossly normal to inspection, PERRL and conjunctivae and sclerae " normal  HENT: ear canals and TM's normal, nose and mouth without ulcers or lesions  NECK: no adenopathy, no asymmetry, masses, or scars  RESP: lungs clear to auscultation - no rales, rhonchi or wheezes  BREAST: normal without masses, tenderness or nipple discharge and no palpable axillary masses or adenopathy  CV: regular rate and rhythm, normal S1 S2, no S3 or S4, no murmur, click or rub, no peripheral edema   ABDOMEN: soft, nontender, no hepatosplenomegaly, no masses and bowel sounds normal   (male): patient deferred /rectal exams  MS: no gross musculoskeletal defects noted, no edema  SKIN: no suspicious lesions or rashes  NEURO: Normal strength and tone, mentation intact and speech normal  PSYCH: mentation appears normal, affect normal/bright  LYMPH: no cervical, supraclavicular, axillary,adenopathy        Signed Electronically by: Mehran Armstrong MD

## 2025-06-02 NOTE — PATIENT INSTRUCTIONS
Patient Education   Preventive Care Advice   This is general advice given by our system to help you stay healthy. However, your care team may have specific advice just for you. Please talk to your care team about your preventive care needs.  Nutrition  Eat 5 or more servings of fruits and vegetables each day.  Try wheat bread, brown rice and whole grain pasta (instead of white bread, rice, and pasta).  Get enough calcium and vitamin D. Check the label on foods and aim for 100% of the RDA (recommended daily allowance).  Lifestyle  Exercise at least 150 minutes each week  (30 minutes a day, 5 days a week).  Do muscle strengthening activities 2 days a week. These help control your weight and prevent disease.  No smoking.  Wear sunscreen to prevent skin cancer.  Have a dental exam and cleaning every 6 months.  Yearly exams  See your health care team every year to talk about:  Any changes in your health.  Any medicines your care team has prescribed.  Preventive care, family planning, and ways to prevent chronic diseases.  Shots (vaccines)   HPV shots (up to age 26), if you've never had them before.  Hepatitis B shots (up to age 59), if you've never had them before.  COVID-19 shot: Get this shot when it's due.  Flu shot: Get a flu shot every year.  Tetanus shot: Get a tetanus shot every 10 years.  Pneumococcal, hepatitis A, and RSV shots: Ask your care team if you need these based on your risk.  Shingles shot (for age 50 and up)  General health tests  Diabetes screening:  Starting at age 35, Get screened for diabetes at least every 3 years.  If you are younger than age 35, ask your care team if you should be screened for diabetes.  Cholesterol test: At age 39, start having a cholesterol test every 5 years, or more often if advised.  Bone density scan (DEXA): At age 50, ask your care team if you should have this scan for osteoporosis (brittle bones).  Hepatitis C: Get tested at least once in your life.  STIs (sexually  transmitted infections)  Before age 24: Ask your care team if you should be screened for STIs.  After age 24: Get screened for STIs if you're at risk. You are at risk for STIs (including HIV) if:  You are sexually active with more than one person.  You don't use condoms every time.  You or a partner was diagnosed with a sexually transmitted infection.  If you are at risk for HIV, ask about PrEP medicine to prevent HIV.  Get tested for HIV at least once in your life, whether you are at risk for HIV or not.  Cancer screening tests  Cervical cancer screening: If you have a cervix, begin getting regular cervical cancer screening tests starting at age 21.  Breast cancer scan (mammogram): If you've ever had breasts, begin having regular mammograms starting at age 40. This is a scan to check for breast cancer.  Colon cancer screening: It is important to start screening for colon cancer at age 45.  Have a colonoscopy test every 10 years (or more often if you're at risk) Or, ask your provider about stool tests like a FIT test every year or Cologuard test every 3 years.  To learn more about your testing options, visit:   .  For help making a decision, visit:   https://bit.ly/nu44874.  Prostate cancer screening test: If you have a prostate, ask your care team if a prostate cancer screening test (PSA) at age 55 is right for you.  Lung cancer screening: If you are a current or former smoker ages 50 to 80, ask your care team if ongoing lung cancer screenings are right for you.  For informational purposes only. Not to replace the advice of your health care provider. Copyright   2023 [employer]. All rights reserved. Clinically reviewed by the [facility] Transitions Program. [employer] [id number] - REV 01/24.

## 2025-06-03 ENCOUNTER — PATIENT OUTREACH (OUTPATIENT)
Dept: CARE COORDINATION | Facility: CLINIC | Age: 50
End: 2025-06-03
Payer: COMMERCIAL

## 2025-06-05 ENCOUNTER — PATIENT OUTREACH (OUTPATIENT)
Dept: CARE COORDINATION | Facility: CLINIC | Age: 50
End: 2025-06-05
Payer: COMMERCIAL

## 2025-06-07 ENCOUNTER — LAB (OUTPATIENT)
Dept: LAB | Facility: CLINIC | Age: 50
End: 2025-06-07
Payer: COMMERCIAL

## 2025-06-07 DIAGNOSIS — E78.1 HYPERTRIGLYCERIDEMIA: ICD-10-CM

## 2025-06-07 DIAGNOSIS — Z12.5 SCREENING PSA (PROSTATE SPECIFIC ANTIGEN): ICD-10-CM

## 2025-06-07 DIAGNOSIS — I10 ESSENTIAL HYPERTENSION WITH GOAL BLOOD PRESSURE LESS THAN 140/90: ICD-10-CM

## 2025-06-07 DIAGNOSIS — Z00.00 ROUTINE GENERAL MEDICAL EXAMINATION AT A HEALTH CARE FACILITY: ICD-10-CM

## 2025-06-07 LAB
ANION GAP SERPL CALCULATED.3IONS-SCNC: 13 MMOL/L (ref 7–15)
BUN SERPL-MCNC: 13.1 MG/DL (ref 6–20)
CALCIUM SERPL-MCNC: 9.4 MG/DL (ref 8.8–10.4)
CHLORIDE SERPL-SCNC: 105 MMOL/L (ref 98–107)
CHOLEST SERPL-MCNC: 222 MG/DL
CREAT SERPL-MCNC: 1.21 MG/DL (ref 0.67–1.17)
EGFRCR SERPLBLD CKD-EPI 2021: 73 ML/MIN/1.73M2
ERYTHROCYTE [DISTWIDTH] IN BLOOD BY AUTOMATED COUNT: 11.5 % (ref 10–15)
FASTING STATUS PATIENT QL REPORTED: YES
FASTING STATUS PATIENT QL REPORTED: YES
GLUCOSE SERPL-MCNC: 114 MG/DL (ref 70–99)
HCO3 SERPL-SCNC: 23 MMOL/L (ref 22–29)
HCT VFR BLD AUTO: 44.5 % (ref 40–53)
HDLC SERPL-MCNC: 32 MG/DL
HGB BLD-MCNC: 15.7 G/DL (ref 13.3–17.7)
LDLC SERPL CALC-MCNC: 127 MG/DL
MCH RBC QN AUTO: 30.7 PG (ref 26.5–33)
MCHC RBC AUTO-ENTMCNC: 35.3 G/DL (ref 31.5–36.5)
MCV RBC AUTO: 87 FL (ref 78–100)
NONHDLC SERPL-MCNC: 190 MG/DL
PLATELET # BLD AUTO: 281 10E3/UL (ref 150–450)
POTASSIUM SERPL-SCNC: 3.8 MMOL/L (ref 3.4–5.3)
PSA SERPL DL<=0.01 NG/ML-MCNC: 0.67 NG/ML (ref 0–3.5)
RBC # BLD AUTO: 5.12 10E6/UL (ref 4.4–5.9)
SODIUM SERPL-SCNC: 141 MMOL/L (ref 135–145)
TRIGL SERPL-MCNC: 316 MG/DL
WBC # BLD AUTO: 6.5 10E3/UL (ref 4–11)

## 2025-06-07 PROCEDURE — G0103 PSA SCREENING: HCPCS

## 2025-06-07 PROCEDURE — 80061 LIPID PANEL: CPT

## 2025-06-07 PROCEDURE — 80048 BASIC METABOLIC PNL TOTAL CA: CPT

## 2025-06-07 PROCEDURE — 36415 COLL VENOUS BLD VENIPUNCTURE: CPT

## 2025-06-07 PROCEDURE — 85027 COMPLETE CBC AUTOMATED: CPT

## 2025-08-30 ENCOUNTER — MYC REFILL (OUTPATIENT)
Dept: FAMILY MEDICINE | Facility: CLINIC | Age: 50
End: 2025-08-30
Payer: COMMERCIAL

## 2025-08-30 DIAGNOSIS — I10 ESSENTIAL HYPERTENSION WITH GOAL BLOOD PRESSURE LESS THAN 140/90: ICD-10-CM

## 2025-08-31 DIAGNOSIS — E78.1 HYPERTRIGLYCERIDEMIA: ICD-10-CM

## 2025-09-02 RX ORDER — FENOFIBRATE 145 MG/1
145 TABLET, FILM COATED ORAL DAILY
Qty: 90 TABLET | Refills: 1 | OUTPATIENT
Start: 2025-09-02

## 2025-09-02 RX ORDER — LOSARTAN POTASSIUM AND HYDROCHLOROTHIAZIDE 12.5; 5 MG/1; MG/1
TABLET ORAL
Qty: 90 TABLET | Refills: 1 | OUTPATIENT
Start: 2025-09-02

## (undated) DEVICE — PREP CHLORAPREP 26ML TINTED ORANGE  260815

## (undated) DEVICE — PAD CHUX UNDERPAD 23X24" 7136

## (undated) DEVICE — KIT ENDO FIRST STEP DISINFECTANT 200ML W/POUCH EP-4